# Patient Record
Sex: FEMALE | Race: WHITE | NOT HISPANIC OR LATINO | Employment: OTHER | URBAN - METROPOLITAN AREA
[De-identification: names, ages, dates, MRNs, and addresses within clinical notes are randomized per-mention and may not be internally consistent; named-entity substitution may affect disease eponyms.]

---

## 2017-04-14 ENCOUNTER — OFFICE VISIT (OUTPATIENT)
Dept: INFECTIOUS DISEASES | Facility: CLINIC | Age: 72
End: 2017-04-14
Attending: INTERNAL MEDICINE
Payer: MEDICARE

## 2017-04-14 VITALS
HEIGHT: 65 IN | BODY MASS INDEX: 32.12 KG/M2 | DIASTOLIC BLOOD PRESSURE: 88 MMHG | WEIGHT: 192.8 LBS | OXYGEN SATURATION: 96 % | TEMPERATURE: 97.8 F | HEART RATE: 92 BPM | SYSTOLIC BLOOD PRESSURE: 142 MMHG

## 2017-04-14 DIAGNOSIS — G62.9 SMALL FIBER NEUROPATHY: Primary | ICD-10-CM

## 2017-04-14 PROCEDURE — 99212 OFFICE O/P EST SF 10 MIN: CPT | Mod: ZF

## 2017-04-14 RX ORDER — HYDROXYZINE HYDROCHLORIDE 25 MG/1
25 TABLET, FILM COATED ORAL PRN
COMMUNITY
End: 2017-09-22

## 2017-04-14 RX ORDER — BUSPIRONE HYDROCHLORIDE 5 MG/1
5 TABLET ORAL PRN
COMMUNITY
End: 2017-09-22

## 2017-04-14 ASSESSMENT — PAIN SCALES - GENERAL: PAINLEVEL: MODERATE PAIN (5)

## 2017-04-14 NOTE — NURSING NOTE
Lyme disease  Pt roomed, vitals, meds, and allergies reviewed with pt. Pt ready for provider.  Josh Urena, CMA

## 2017-04-14 NOTE — LETTER
"4/14/2017      RE: Alise Colon  [address] [place] [address]       [facility] [place]   Infectious Diseases and International Medicine  [facility] [phone]    Patient:  Alise Colon   Date of birth 1945   Medical record number [id number]  Date of Visit:  04/14/2017            Assessment and Recommendations:     Assessment:  71 year old woman with multiple symptoms without clear explanation here to discuss possible lyme disease. The patient has numerous chronic symptoms and has seen multiple specialists, including ID, about 9 months ago. Her lyme western blot x 2 over the summer were negative for IgG antibodies. She has blood work through [facility] which was positive for 2 bands. We discussed the nature of antibody testing, which is an indirect measure of previous infection. Memory antibodies, or IgG, should remain positive for life, particularly if someone has untreated disease. A negative IgG through a reputable lab performing FDA approved assays, is sufficient to say that her chronic symptoms are not do to Lyme disease. I did discuss the risk of Lyme for [place], and that new infections in the Spring and Summer are common.    Recommendations:  1. Reassurance given. Patent expressed satisfaction in our discussion.  2. No work-up or treatments recommended    F/up PRN    Ishan Guzman MD    Infectious Diseases and International Medicine  Pager: [phone]        History of Present Illness:     Patient is referred to ID clinic by Ronda Valencia MD for consultation regarding Lyme disease.    Alise Colon is a 71 year old woman with questions regarding her symptoms and lyme disease. The patient has chronic symptoms which give her the sensation that she is \"under attack.\" She has a print-out explaining her symptoms and recent medical encounters. Her symptoms include: tingling/pin bricks in hands, " "feet, lower legs, face; burning feet; aching/burning joints; jaw stiffness/stiff neck/sore spine; headaches; buzzing ears/decreased hearing; red eyes/blurry vision; word finding problems; difficulty multi-tasking. She has seen rheumatology and was diagnosed with RA. She tells me that since her inflammatory markers are low, she does not require treatment. She did see Dr. Choudhury, with ID last June at [facility]. She had testing for lyme and HepC and both were negative. In addition, she took it upon herself to go to the [facility] for syphilis testing. This was also negative. Her primary concern involves yard work at home several years ago. While she was working she noticed a red round michael on her arm. [place] is adjoining [place] heavily [place]ed area, therefore she was worried about Lyme disease.    The patient has done research on Lyme disease and worries about this \"silent epidemic.\" she wonders what research there is on this topic. The patient has not been treated specifically for lyme in the past, but alludes to previous antibiotic treatments for other infections. She also has a h/o c.diff infection.        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  Small fiber neuropathy, treated with neurontin  YUMIKO       Past Surgical History:     Past Surgical History:   Procedure Laterality Date     COLONOSCOPY  9/18/2012    Procedure: COLONOSCOPY;  COLONOSCOPY ;  Surgeon: Evan Hays MD;  Location:  GI     KNEE SURGERY      left     MAMMOPLASTY REDUCTION       ORTHOPEDIC SURGERY  2012    toe fused     ORTHOPEDIC SURGERY  2011    knee arthroscopy     TOE SURGERY      left big toe     TONSILLECTOMY             Allergies:      Allergies   Allergen " "Reactions     Morphine Other (See Comments)     Severe agitation     Hydrocodone-Acetaminophen Nausea     Sertraline Other (See Comments)     Head ache     Cephalexin Hcl      Lisinopril      Percocet [Oxycodone-Acetaminophen]      Plaquenil [Hydroxychloroquine]      Worsening tinnitus  Worsening tinnitus     Remeron [Mirtazapine] Swelling     Ankle edema     Toradol      Redness, numbness in hands          Current Medications:     Current Outpatient Prescriptions   Medication     nortriptyline (PAMELOR) 10 MG capsule     gabapentin (NEURONTIN) 300 MG capsule     ASPIRIN PO     losartan (COZAAR) 25 MG tablet     fluocinolone acetonide 0.01 % OIL     omeprazole (PRILOSEC) 10 MG capsule     ketoconazole (NIZORAL) 2 % cream     losartan-hydrochlorothiazide (HYZAAR) 100-25 MG per tablet     OMEPRAZOLE PO     VITAMIN D, CHOLECALCIFEROL, PO     No current facility-administered medications for this visit.            Family History:     Family History   Problem Relation Age of Onset     Anxiety Disorder Mother      Dementia Mother 88          Social History:     Social History     Social History     Marital status:      Spouse name: N/A     Number of children: N/A     Years of education: N/A     Occupational History     Not on file.     Social History Main Topics     Smoking status: Former Smoker     Packs/day: 1.00     Years: 20.00     Quit date: 1/1/1996     Smokeless tobacco: Not on file     Alcohol use No     Drug use: No     Sexual activity: Not on file     Other Topics Concern     Not on file     Social History Narrative          Physical Exam:   /88  Pulse 92  Temp 97.8  F (36.6  C) (Oral)  Ht 1.651 m (5' 5\")  Wt 87.5 kg (192 lb 12.8 oz)  SpO2 96%  BMI 32.08 kg/m2     Exam:  GENERAL:  well-developed, well-nourished, in no acute distress. Friendly  HENT:  Head is normocephalic, atraumatic.   EYES:  Eyes have anicteric sclerae without conjunctival injection   NEUROLOGIC:  Grossly nonfocal. Normal " gait and station  PSYCH: nl affect             Laboratory Data:     Creatinine   Date Value Ref Range Status   10/19/2016 0.72 0.52 - 1.04 mg/dL Final   07/06/2016 0.69 0.52 - 1.04 mg/dL Final     WBC   Date Value Ref Range Status   10/19/2016 6.4 4.0 - 11.0 10e9/L Final   07/06/2016 5.7 4.0 - 11.0 10e9/L Final     Hemoglobin   Date Value Ref Range Status   10/19/2016 14.8 11.7 - 15.7 g/dL Final     Platelet Count   Date Value Ref Range Status   10/19/2016 237 150 - 450 10e9/L Final     Sed Rate   Date Value Ref Range Status   10/19/2016 13 0 - 30 mm/h Final   07/06/2016 13 0 - 30 mm/h Final     CRP Inflammation   Date Value Ref Range Status   10/19/2016 5.8 0.0 - 8.0 mg/L Final   07/06/2016 8.3 (H) 0.0 - 8.0 mg/L Final     AST   Date Value Ref Range Status   10/19/2016 19 0 - 45 U/L Final   07/06/2016 17 0 - 45 U/L Final     ALT   Date Value Ref Range Status   10/19/2016 22 0 - 50 U/L Final   07/06/2016 15 0 - 50 U/L Final     Alkaline Phosphatase   Date Value Ref Range Status   10/19/2016 77 40 - 150 U/L Final     Bilirubin Total   Date Value Ref Range Status   10/19/2016 0.6 0.2 - 1.3 mg/dL Final       7/6/16 lyme Ab negative            Attestation:  I have reviewed the electronic medical record for the past visits and hospitalizations. I have reviewed relevant Medications, Vital Signs and Labs.     Ishan Guzman MD

## 2017-04-14 NOTE — PROGRESS NOTES
"[facility]   Infectious Diseases and International Medicine  [facility] [phone]    Patient:  Alise Colon   Date of birth 1945   Medical record number [id number]  Date of Visit:  04/14/2017            Assessment and Recommendations:     Assessment:  71 year old woman with multiple symptoms without clear explanation here to discuss possible lyme disease. The patient has numerous chronic symptoms and has seen multiple specialists, including ID, about 9 months ago. Her lyme western blot x 2 over the summer were negative for IgG antibodies. She has blood work through [facility] lab which was positive for 2 bands. We discussed the nature of antibody testing, which is an indirect measure of previous infection. Memory antibodies, or IgG, should remain positive for life, particularly if someone has untreated disease. A negative IgG through a reputable lab performing FDA approved assays, is sufficient to say that her chronic symptoms are not do to Lyme disease. I did discuss the risk of Lyme for [place], and that new infections in the Spring and Summer are common.    Recommendations:  1. Reassurance given. Patent expressed satisfaction in our discussion.  2. No work-up or treatments recommended    F/up PRN    Ishan Guzman MD    Infectious Diseases and International Medicine  Pager: [phone]        History of Present Illness:     Patient is referred to ID clinic by Ronda Valencia MD for consultation regarding Lyme disease.    Alise Colon is a 71 year old woman with questions regarding her symptoms and lyme disease. The patient has chronic symptoms which give her the sensation that she is \"under attack.\" She has a print-out explaining her symptoms and recent medical encounters. Her symptoms include: tingling/pin bricks in hands, feet, lower legs, face; burning feet; aching/burning joints; jaw stiffness/stiff neck/sore " "spine; headaches; buzzing ears/decreased hearing; red eyes/blurry vision; word finding problems; difficulty multi-tasking. She has seen rheumatology and was diagnosed with RA. She tells me that since her inflammatory markers are low, she does not require treatment. She did see Dr. Choudhury, with ID last June at [facility]. She had testing for lyme and HepC and both were negative. In addition, she took it upon herself to go to [facility] for syphilis testing. This was also negative. Her primary concern involves yard work at home several years ago. While she was working she noticed a red round michael on her arm. This is adjoining a heavily [place]ed area, therefore she was worried about Lyme disease.    The patient has done research on Lyme disease and worries about this \"silent epidemic.\" she wonders what research there is on this topic. The patient has not been treated specifically for lyme in the past, but alludes to previous antibiotic treatments for other infections. She also has a h/o c.diff infection.        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  Small fiber neuropathy, treated with neurontin  [facility]IKO       Past Surgical History:     Past Surgical History:   Procedure Laterality Date     COLONOSCOPY  9/18/2012    Procedure: COLONOSCOPY;  COLONOSCOPY ;  Surgeon: Evan Hays MD;  Location:  GI     KNEE SURGERY      left     MAMMOPLASTY REDUCTION       ORTHOPEDIC SURGERY  2012    toe fused     ORTHOPEDIC SURGERY  2011    knee arthroscopy     TOE SURGERY      left big toe     TONSILLECTOMY             Allergies:      Allergies   Allergen Reactions     Morphine Other (See Comments)     Severe agitation     Hydrocodone-Acetaminophen " "Nausea     Sertraline Other (See Comments)     Head ache     Cephalexin Hcl      Lisinopril      Percocet [Oxycodone-Acetaminophen]      Plaquenil [Hydroxychloroquine]      Worsening tinnitus  Worsening tinnitus     Remeron [Mirtazapine] Swelling     Ankle edema     Toradol      Redness, numbness in hands          Current Medications:     Current Outpatient Prescriptions   Medication     nortriptyline (PAMELOR) 10 MG capsule     gabapentin (NEURONTIN) 300 MG capsule     ASPIRIN PO     losartan (COZAAR) 25 MG tablet     fluocinolone acetonide 0.01 % OIL     omeprazole (PRILOSEC) 10 MG capsule     ketoconazole (NIZORAL) 2 % cream     losartan-hydrochlorothiazide (HYZAAR) 100-25 MG per tablet     OMEPRAZOLE PO     VITAMIN D, CHOLECALCIFEROL, PO     No current facility-administered medications for this visit.            Family History:     Family History   Problem Relation Age of Onset     Anxiety Disorder Mother      Dementia Mother 88          Social History:     Social History     Social History     Marital status:      Spouse name: N/A     Number of children: N/A     Years of education: N/A     Occupational History     Not on file.     Social History Main Topics     Smoking status: Former Smoker     Packs/day: 1.00     Years: 20.00     Quit date: 1/1/1996     Smokeless tobacco: Not on file     Alcohol use No     Drug use: No     Sexual activity: Not on file     Other Topics Concern     Not on file     Social History Narrative          Physical Exam:   /88  Pulse 92  Temp 97.8  F (36.6  C) (Oral)  Ht 1.651 m (5' 5\")  Wt 87.5 kg (192 lb 12.8 oz)  SpO2 96%  BMI 32.08 kg/m2     Exam:  GENERAL:  well-developed, well-nourished, in no acute distress. Friendly  HENT:  Head is normocephalic, atraumatic.   EYES:  Eyes have anicteric sclerae without conjunctival injection   NEUROLOGIC:  Grossly nonfocal. Normal gait and station  PSYCH: nl affect             Laboratory Data:     Creatinine   Date Value Ref " Range Status   10/19/2016 0.72 0.52 - 1.04 mg/dL Final   07/06/2016 0.69 0.52 - 1.04 mg/dL Final     WBC   Date Value Ref Range Status   10/19/2016 6.4 4.0 - 11.0 10e9/L Final   07/06/2016 5.7 4.0 - 11.0 10e9/L Final     Hemoglobin   Date Value Ref Range Status   10/19/2016 14.8 11.7 - 15.7 g/dL Final     Platelet Count   Date Value Ref Range Status   10/19/2016 237 150 - 450 10e9/L Final     Sed Rate   Date Value Ref Range Status   10/19/2016 13 0 - 30 mm/h Final   07/06/2016 13 0 - 30 mm/h Final     CRP Inflammation   Date Value Ref Range Status   10/19/2016 5.8 0.0 - 8.0 mg/L Final   07/06/2016 8.3 (H) 0.0 - 8.0 mg/L Final     AST   Date Value Ref Range Status   10/19/2016 19 0 - 45 U/L Final   07/06/2016 17 0 - 45 U/L Final     ALT   Date Value Ref Range Status   10/19/2016 22 0 - 50 U/L Final   07/06/2016 15 0 - 50 U/L Final     Alkaline Phosphatase   Date Value Ref Range Status   10/19/2016 77 40 - 150 U/L Final     Bilirubin Total   Date Value Ref Range Status   10/19/2016 0.6 0.2 - 1.3 mg/dL Final       7/6/16 lyme Ab negative            Attestation:  I have reviewed the electronic medical record for the past visits and hospitalizations. I have reviewed relevant Medications, Vital Signs and Labs.

## 2017-04-14 NOTE — MR AVS SNAPSHOT
"              After Visit Summary   4/14/2017    Alise Colon    MRN: [id number]           Patient Information     Date Of Birth          1945        Visit Information        Provider Department      4/14/2017 1:00 PM Ishan Guzman MD [facility] and Infectious Diseases         Follow-ups after your visit        Follow-up notes from your care team     Return if symptoms worsen or fail to improve.      Your next 10 appointments already scheduled     Aug 02, 2017 10:00 AM CDT   (Arrive by 9:45 AM)   Return Visit with Darío Ambrocio MD   [facility] ([facility])    [address] [place] [address]  3rd Floor  [facility] [address] [phone]              Who to contact     If you have questions or need follow up information about today's clinic visit or your schedule please contact [facility] AND INFECTIOUS DISEASES directly at [phone].  Normal or non-critical lab and imaging results will be communicated to you by MyChart, letter or phone within 4 business days after the clinic has received the results. If you do not hear from us within 7 days, please contact the clinic through MyChart or phone. If you have a critical or abnormal lab result, we will notify you by phone as soon as possible.  Submit refill requests through [employer] ([employer]) or call your pharmacy and they will forward the refill request to us. Please allow 3 business days for your refill to be completed.          Additional Information About Your Visit        [employer] Information     [employer] ([employer]) lets you send messages to your doctor, view your test results, renew your prescriptions, schedule appointments and more. To sign up, go to [email or web address] . Click on \"Log in\" on the left side of the screen, which will take you to the Welcome page. Then click on \"Sign up Now\" on the right side of the page.     You will be asked to enter the access code listed below, as well as some personal " "information. Please follow the directions to create your username and password.     Your access code is: [email or web address]  Expires: 7/3/2017  6:30 AM     Your access code will  in 90 days. If you need help or a new code, please call your [facility] or [phone].        Care EveryWhere ID     This is your Care EveryWhere ID. This could be used by other organizations to access your [facility] medical records  [id number]        Your Vitals Were     Pulse Temperature Height Pulse Oximetry BMI (Body Mass Index)       92 97.8  F (36.6  C) (Oral) 1.651 m (5' 5\") 96% 32.08 kg/m2        Blood Pressure from Last 3 Encounters:   17 142/88   16 (!) 170/97   10/19/16 (!) 160/99    Weight from Last 3 Encounters:   17 87.5 kg (192 lb 12.8 oz)   16 84.2 kg (185 lb 9.6 oz)   10/19/16 84.2 kg (185 lb 9.6 oz)              Today, you had the following     No orders found for display       Primary Care Provider Office Phone # Fax #    Ana Rosa Lima [phone]       [facility] [address]  [facility] [address]        Thank you!     Thank you for choosing [facility] AND INFECTIOUS DISEASES  for your care. Our goal is always to provide you with excellent care. Hearing back from our patients is one way we can continue to improve our services. Please take a few minutes to complete the written survey that you may receive in the mail after your visit with us. Thank you!             Your Updated Medication List - Protect others around you: Learn how to safely use, store and throw away your medicines at www.disposemymeds.org.          This list is accurate as of: 17  1:25 PM.  Always use your most recent med list.                   Brand Name Dispense Instructions for use    ASPIRIN PO      Take 81 mg by mouth daily       busPIRone 5 MG tablet    BUSPAR     Take 5 mg by mouth as needed       fluocinolone acetonide 0.01 % Oil          gabapentin 300 MG capsule "    NEURONTIN     300 mg       hydrOXYzine 25 MG tablet    ATARAX     Take 25 mg by mouth as needed for itching       ketoconazole 2 % cream    NIZORAL         losartan-hydrochlorothiazide 100-25 MG per tablet    HYZAAR     Take 0.5 tablets by mouth daily       nortriptyline 10 MG capsule    PAMELOR     Take 10 mg by mouth       * OMEPRAZOLE PO      Take 10 mg by mouth every morning       * priLOSEC 10 MG CR capsule   Generic drug:  omeprazole      Take 10 mg by mouth       VISTARIL PO      Take by mouth as needed for itching       VITAMIN D (CHOLECALCIFEROL) PO      Take 2,000 Units by mouth daily       * Notice:  This list has 2 medication(s) that are the same as other medications prescribed for you. Read the directions carefully, and ask your doctor or other care provider to review them with you.

## 2017-07-20 ENCOUNTER — TELEPHONE (OUTPATIENT)
Dept: RHEUMATOLOGY | Facility: CLINIC | Age: 72
End: 2017-07-20

## 2017-07-20 NOTE — TELEPHONE ENCOUNTER
Pt asking lab orders for 8/2/17 Dr. Ambrocio appt be put in. Also asking for confirmation that he does not have afternoon appts as she was switched from AM appt. Please advise at [phone]. Sent to [employer].

## 2017-07-21 NOTE — TELEPHONE ENCOUNTER
Called and spoke with pt regarding upcoming appt.  Discussed that this provider has not seen her at this time and he will order labs after he has been able to discuss with her.  Also let her know that Dr. Ambrocio does have afternoon appts and currently I would need to have her come in, in late September if she wishes to wait.  Pt understands and wishes to keep her morning appt at this time.  Did let her know that I am Dr. Avery nurse and she can call at any time.    Kim Yates RN  [facility] Clinic

## 2017-09-22 ENCOUNTER — OFFICE VISIT (OUTPATIENT)
Dept: RHEUMATOLOGY | Facility: CLINIC | Age: 72
End: 2017-09-22
Attending: INTERNAL MEDICINE
Payer: MEDICARE

## 2017-09-22 VITALS
HEART RATE: 55 BPM | DIASTOLIC BLOOD PRESSURE: 86 MMHG | BODY MASS INDEX: 33.29 KG/M2 | WEIGHT: 199.8 LBS | OXYGEN SATURATION: 94 % | HEIGHT: 65 IN | SYSTOLIC BLOOD PRESSURE: 148 MMHG

## 2017-09-22 DIAGNOSIS — M25.50 PAIN IN JOINT, MULTIPLE SITES: ICD-10-CM

## 2017-09-22 DIAGNOSIS — R76.8 ANTI-CYCLIC CITRULLINATED PEPTIDE ANTIBODY POSITIVE: ICD-10-CM

## 2017-09-22 DIAGNOSIS — R53.82 CHRONIC FATIGUE: ICD-10-CM

## 2017-09-22 DIAGNOSIS — E55.9 VITAMIN D DEFICIENCY: ICD-10-CM

## 2017-09-22 DIAGNOSIS — Z85.72 H/O LYMPHOMA: ICD-10-CM

## 2017-09-22 DIAGNOSIS — R76.8 ANTI-CYCLIC CITRULLINATED PEPTIDE ANTIBODY POSITIVE: Primary | ICD-10-CM

## 2017-09-22 LAB
CRP SERPL-MCNC: 5.6 MG/L (ref 0–8)
ERYTHROCYTE [SEDIMENTATION RATE] IN BLOOD BY WESTERGREN METHOD: 13 MM/H (ref 0–30)
VIT B12 SERPL-MCNC: 526 PG/ML (ref 193–986)

## 2017-09-22 PROCEDURE — 86431 RHEUMATOID FACTOR QUANT: CPT | Performed by: INTERNAL MEDICINE

## 2017-09-22 PROCEDURE — 85652 RBC SED RATE AUTOMATED: CPT | Performed by: INTERNAL MEDICINE

## 2017-09-22 PROCEDURE — 99212 OFFICE O/P EST SF 10 MIN: CPT | Mod: ZF

## 2017-09-22 PROCEDURE — 82607 VITAMIN B-12: CPT | Performed by: INTERNAL MEDICINE

## 2017-09-22 PROCEDURE — 86235 NUCLEAR ANTIGEN ANTIBODY: CPT | Performed by: INTERNAL MEDICINE

## 2017-09-22 PROCEDURE — 86200 CCP ANTIBODY: CPT | Performed by: INTERNAL MEDICINE

## 2017-09-22 PROCEDURE — 86140 C-REACTIVE PROTEIN: CPT | Performed by: INTERNAL MEDICINE

## 2017-09-22 PROCEDURE — 36415 COLL VENOUS BLD VENIPUNCTURE: CPT | Performed by: INTERNAL MEDICINE

## 2017-09-22 PROCEDURE — 86038 ANTINUCLEAR ANTIBODIES: CPT | Performed by: INTERNAL MEDICINE

## 2017-09-22 ASSESSMENT — PAIN SCALES - GENERAL: PAINLEVEL: SEVERE PAIN (7)

## 2017-09-22 NOTE — MR AVS SNAPSHOT
After Visit Summary   9/22/2017    Alise Colon    MRN: [id number]           Patient Information     Date Of Birth          1945        Visit Information        Provider Department      9/22/2017 10:30 AM Dylon Bennett,  [facility]        Today's Diagnoses     Anti-cyclic citrullinated peptide antibody positive    -  1    Pain in joint, multiple sites        Vitamin D deficiency         Chronic fatigue        H/O lymphoma          Care Instructions    -- we agree with the stretching exercises as well as the pool therapy.  -- we will check some labs today  -- xrays for the hands and the feet  -- see you again in 1 year.          Follow-ups after your visit        Follow-up notes from your care team     Return in about 1 year (around 9/22/2018).      Your next 10 appointments already scheduled     Sep 22, 2017 12:25 PM CDT   XR FOOT BILATERAL G/E 3 VIEWS with XR1   [facility] ([facility])    [address] [place] [id number] [phone]           Please bring a list of your current medicines to your exam. (Include vitamins, minerals and over-thecounter medicines.) Leave your valuables at home.  Tell your doctor if there is a chance you may be pregnant.  You do not need to do anything special for this exam.            Sep 22, 2017 12:50 PM CDT   XR HAND BILATERAL G/E 3 VIEWS with XR1   [facility] ([facility])    [address] [place] [id number] [phone]           Please bring a list of your current medicines to your exam. (Include vitamins, minerals and over-thecounter medicines.) Leave your valuables at home.  Tell your doctor if there is a chance you may be pregnant.  You do not need to do anything special for this exam.            Sep 22, 2017  1:15 PM CDT   Lab with  LAB   [facility] ([facility])  "   [address]  [address] [facility] [id number] [phone]            Sep 21, 2018 10:30 AM CDT   (Arrive by 10:15 AM)   Return Visit with Dylon Bennett DO   [facility] ([facility])    [address]  [address] [facility] [id number] [phone]              Future tests that were ordered for you today     Open Future Orders        Priority Expected Expires Ordered    SSA Ro INES Antibody IgG Routine  9/22/2018 9/22/2017    SSB La INES Antibody IgG Routine  9/22/2018 9/22/2017    Vitamin B12 Routine  9/22/2018 9/22/2017    CRP inflammation Routine  9/22/2018 9/22/2017    X-ray bl Foot 3+ views Routine 9/22/2017 4/20/2018 9/22/2017    XR Hand Bilateral G/E 3 Views Routine 9/22/2017 9/22/2018 9/22/2017    Cyclic Citrullinated Peptide Antibody IgG Routine  9/22/2018 9/22/2017    Rheumatoid factor Routine  9/22/2018 9/22/2017    ESR Routine  9/22/2018 9/22/2017            Who to contact     If you have questions or need follow up information about today's clinic visit or your schedule please contact [facility] directly at [phone].  Normal or non-critical lab and imaging results will be communicated to you by MyChart, letter or phone within 4 business days after the clinic has received the results. If you do not hear from us within 7 days, please contact the clinic through [employer] or phone. If you have a critical or abnormal lab result, we will notify you by phone as soon as possible.  Submit refill requests through [employer] or call your pharmacy and they will forward the refill request to us. Please allow 3 business days for your refill to be completed.          Additional Information About Your Visit        [employer] Information     [employer] lets you send messages to your doctor, view your test results, renew your prescriptions, schedule appointments and more. To sign up, go to [email or web address] . Click on \"Log in\" on the left " "side of the screen, which will take you to the Welcome page. Then click on \"Sign up Now\" on the right side of the page.     You will be asked to enter the access code listed below, as well as some personal information. Please follow the directions to create your username and password.     Your access code is: [email or web address]  Expires: 10/17/2017  6:30 AM     Your access code will  in 90 days. If you need help or a new code, please call your [facility] or [phone].        Care EveryWhere ID     This is your Care EveryWhere ID. This could be used by other organizations to access your [place] medical records  [id number]        Your Vitals Were     Pulse Height Pulse Oximetry BMI (Body Mass Index)          55 1.651 m (5' 5\") 94% 33.25 kg/m2         Blood Pressure from Last 3 Encounters:   17 148/86   17 142/88   16 (!) 170/97    Weight from Last 3 Encounters:   17 90.6 kg (199 lb 12.8 oz)   17 87.5 kg (192 lb 12.8 oz)   16 84.2 kg (185 lb 9.6 oz)              We Performed the Following     Anti Nuclear Cha IgG by IFA with Reflex        Primary Care Provider Office Phone # Fax #    Ana Rosa Lima [phone]       [facility] [place], [facility] [address] [address]  [facility] [address]        Equal Access to Services     [facility]: Hadii aad ku hadasho Soomaali, waaxda luqadaha, qaybta kaalmada carmeloyamyriam, colin taylor . So [facility] [phone].    ATENCIÓN: Si bhartila andriy, tiene a tucker disposición servicios gratuitos de asistencia lingüística. Llame al [phone].    We comply with applicable federal civil rights laws and [place] laws. We do not discriminate on the basis of race, color, national origin, age, disability sex, sexual orientation or gender identity.            Thank you!     Thank you for choosing [facility] RHEUMATOLOG[facility]  for your care. Our goal is always to provide you with excellent care. Hearing back from our patients is " one way we can continue to improve our services. Please take a few minutes to complete the written survey that you may receive in the mail after your visit with us. Thank you!             Your Updated Medication List - Protect others around you: Learn how to safely use, store and throw away your medicines at www.disposemymeds.org.          This list is accurate as of: 9/22/17 12:22 PM.  Always use your most recent med list.                   Brand Name Dispense Instructions for use Diagnosis    ALPHA LIPOIC ACID PO      Take 200 mg by mouth daily    Anti-cyclic citrullinated peptide antibody positive, Pain in joint, multiple sites       gabapentin 300 MG capsule    NEURONTIN     600 mg daily        ketoconazole 2 % cream    NIZORAL     shampoo    Polycythemia       losartan-hydrochlorothiazide 100-25 MG per tablet    HYZAAR     Take 0.5 tablets by mouth daily        priLOSEC 10 MG CR capsule   Generic drug:  omeprazole      Take 10 mg by mouth    Polycythemia       PROPRANOLOL HCL PO      Take 60 mg by mouth daily    Anti-cyclic citrullinated peptide antibody positive, Pain in joint, multiple sites       VITAMIN D (CHOLECALCIFEROL) PO      Take 2,000 Units by mouth daily

## 2017-09-22 NOTE — LETTER
9/22/2017      RE: Alise Cloon  [address] [place] [address]       [facility] - Rheumatology Clinic Visit     Alise Colon MRN# [id number]   YOB: 1945    Primary care provider: Ana Rosa Lima  Date of Visit: 9/22/2017  Last Seen: 8/1/2016          Assessment and Plan:   # Subclinical Rheumatoid Arthritis (strongly positive ACPA and mildly positive RF) onset at least 2009  # Paresthesia- chronic  # Family history of chron's disease  # DVT 2011  # H/o NHL 1999; s/p CHOP; no radiotherapy;  # Erythrocytosis- hematology evaluation 2016  # Vitamin D deficiency - currently ongoing replacement with PCP    This is the fourth rheumatology opinion/consultation in the last three to four years.   Last visit Ms. Colon brought the results of [place] igenex lyme's test in which three bands were positive (2016). However lyme's screening was negative in 2014.Also negative in our lab. Patient was reassured.   Seropositive and likely subclinical rheumatoid arthritis is the diagnosis at this point of time. Patient was advised though that it could accelerate at some point in the future to become a clear-cut seropositive rheumatoid arthritis. Previous Prednisone trial without any significant subjective changes. We would not initiate any specific treatment for positive serologies alone as there is no synovitis at this time and patient does not seem to be interested in any DMARD. In addition there are no definite erosive changes on the recent Hands XR.     The most bothering symptom for the patient is still burning sensation in hands and feet. We believe it is likely due to peripheral neuropathy, etiology is still unknown. Not previously associated with her chemotherapy.  No evidence of Polycythemia vera.    We would like to see Ms. Colon in one year.  Repeat hand and foot x-ray today.  Repeat CCP and RF labs.    The patient was seen and discussed with Dr. Keating who  agrees with the above assessment and plan.     Dylon Bennett   Rheumatology Fellow    Attending Note: I saw and evaluated the patient with Dr. Bennett. I agree with the assessment and plan.    Brigitte Keating MD    Orders Placed This Encounter   Procedures     X-ray bl Foot 3+ views     XR Hand Bilateral G/E 3 Views     Cyclic Citrullinated Peptide Antibody IgG     Rheumatoid factor     ESR     CRP inflammation     Vitamin B12     Anti Nuclear Cha IgG by IFA with Reflex     SSA Ro INES Antibody IgG     SSB La INES Antibody IgG                 Active Problem List:     Patient Active Problem List    Diagnosis Date Noted     Polycythemia 10/19/2016     Priority: Medium     Major depressive disorder 01/19/2016     Priority: Medium     Headache 02/05/2015     Priority: Medium     Problem list name updated by automated process. Provider to review              History of Present Illness:     No chief complaint on file.  Initially seen July 6, 2016     Ms. Colon is a 70 year old lady referred for second opinion on RA diagnosis and management.    Current joint pain history:   Joint pain history:   Onset: 2009; worse in the last 1.5 years  Involved joints: MTPs, wrists, knees, ankles   Pain scale: 8/10 in AM; all these areas worse in AM     Wakes the patient from sleep : sometimes  Morning stiffness : 3 hours  Meds used: plaquenil made tinnitus worse;     Myalgias in calves +ve  Daughter has chron's disease    DVT 2011  H/o NHL 1999; s/p CHOP; no radiotherapy;    No h/o unintentional weight loss, loss of appetite, fevers, chills, rash, swollen glands  No h/o gout  No family or personal history of psoriasis, ulcerative colitis or chron's disease. No h/o sausage digits, iritis. No enthesitis, plantar fascitis or heel pain.   Patient denies any raynauds, sicca symptoms,  No h/o persistent shortness of breath, cough, chest pain  No h/o persistent nausea, vomiting, constipation, diarrhea, abdominal pain  No h/o hematochezia, hematuria,  "hemoptysis, hematemesis  No h/o seizures or strokes    CCP > 250  SANFORD homogenous 1:80  Mildly positive RF  Lyme negative.   CRP borderline elevation +ve    Right foot films ordered and independently interpreted.   Indication: Right foot pain.   Assessment: Significant hallux valgus and 1st MTP arthritis, otherwise unremarkable.     Left foot films ordered and independently interpreted.   Indication: Left foot pain   Assessment: Fusion of the great toe, crossing screws in place, no acute findings.    Interim history 8/1/16:  CCP 89 (trended down)  SANFORD homogenous: negative (trended down)  RF: negative (trended down)  Lyme serology: negative.   CRP borderline elevation +ve: 8.3  Sed Rate: negative  Hep C: equivocal results, will repeat Hep C antibody today    Patient's main complain is numbing, burning and aching sensation in her feet and calfes right after awakening in the morning. She does not have a morning stiffness as such but muscle pain. 8/10. However it takes her up to 1 hour to get back to her baseline (which is an improvement from 3 hours before). She does not have any stiffness in her hands. He sis not experienced any changes while taking Prednisone for 2 weeks (10 mg daily for 14 days). She states that her muscle \"pain\" (eg, burning sensation) is alleviated by hot tube use.     Interim History 8/1/2016 - 9/22/2017:  Patient returns continued joint pains and paresthesias does not developed any morning stiffness of her hands.  She mentions that the pain she gets is more constant and did not previously respond to prednisone trials.  The sore and burning sensations will occasionally move from her toes to her knees to her fingers to her wrists and ankles.  She feels like she will have neck creaks and cracks.  Denies any fevers chills no lymphadenopathy or unintended weight loss.  Denies any hot or swollen joints with her pains.         Review of Systems:   Complete ROS negative except for symptoms mentioned in " the HPI          Past Medical History:     Past Medical History:   Diagnosis Date     Back pain      Depressive disorder      Fibromyalgia age 43     Hypertension      Lymphoma (H) 1999    started chemo     Past Surgical History:   Procedure Laterality Date     COLONOSCOPY  9/18/2012    Procedure: COLONOSCOPY;  COLONOSCOPY ;  Surgeon: Evan Hays MD;  Location:  GI     KNEE SURGERY      left     MAMMOPLASTY REDUCTION       ORTHOPEDIC SURGERY  2012    toe fused     ORTHOPEDIC SURGERY  2011    knee arthroscopy     TOE SURGERY      left big toe     TONSILLECTOMY              Social History:     Social History     Occupational History     Not on file.     Social History Main Topics     Smoking status: Former Smoker     Packs/day: 1.00     Years: 20.00     Quit date: 1/1/1996     Smokeless tobacco: Not on file     Alcohol use No     Drug use: No     Sexual activity: Not on file            Family History:     Family History   Problem Relation Age of Onset     Anxiety Disorder Mother      Dementia Mother 88            Allergies:     Allergies   Allergen Reactions     Morphine Other (See Comments)     Severe agitation     Hydrocodone-Acetaminophen Nausea     Sertraline Other (See Comments)     Head ache     Cephalexin Hcl      Ketorolac      Lisinopril      Percocet [Oxycodone-Acetaminophen]      Plaquenil [Hydroxychloroquine]      Worsening tinnitus  Worsening tinnitus     Remeron [Mirtazapine] Swelling     Ankle edema     Toradol      Redness, numbness in hands            Medications:     Current Outpatient Prescriptions   Medication Sig Dispense Refill     PROPRANOLOL HCL PO Take 60 mg by mouth daily       ALPHA LIPOIC ACID PO Take 200 mg by mouth daily       gabapentin (NEURONTIN) 300 MG capsule 600 mg daily        omeprazole (PRILOSEC) 10 MG capsule Take 10 mg by mouth       ketoconazole (NIZORAL) 2 % cream shampoo       losartan-hydrochlorothiazide (HYZAAR) 100-25 MG per tablet Take 0.5 tablets by mouth daily    "    VITAMIN D, CHOLECALCIFEROL, PO Take 2,000 Units by mouth daily               Physical Exam:   Blood pressure 148/86, pulse 55, height 1.651 m (5' 5\"), weight 90.6 kg (199 lb 12.8 oz), SpO2 94 %.  Wt Readings from Last 4 Encounters:   09/22/17 90.6 kg (199 lb 12.8 oz)   04/14/17 87.5 kg (192 lb 12.8 oz)   11/01/16 84.2 kg (185 lb 9.6 oz)   10/19/16 84.2 kg (185 lb 9.6 oz)     Constitutional: well-developed, appearing stated age; cooperative  Eyes: nl EOM, vision, conjunctiva, sclera  ENT: nl external ears, nose, hearing, lips, teeth, gums, throat  No mucous membrane lesions, normal saliva pool  Neck: no mass or thyroid enlargement  Resp: lungs clear to auscultation,   CV: RRR, no murmurs, rubs or gallops, no edema  GI: no ABD mass or tenderness, no HSM  : not tested  Lymph: no cervical, supraclavicular or epitrochlear nodes    MS: very minimal thickening noted in MCPs without tenderness. MTP tenderness present in the 1st CMC.   All shoulder, elbow, wrist, MCP/PIP/DIP, hip, knee, ankle, and foot MTP/IP joints were examined and  found normal. No active synovitis or deformity. Full ROM.  Fist 100%.  No dactylitis,  tenosynovitis, enthesopathy.  Skin: no nail pitting, alopecia, rash, nodules or lesions  Psych: nl judgement, orientation, memory, affect.         Data:     Results for orders placed or performed in visit on 10/19/16   CBC with platelets differential   Result Value Ref Range    WBC 6.4 4.0 - 11.0 10e9/L    RBC Count 5.97 (H) 3.8 - 5.2 10e12/L    Hemoglobin 14.8 11.7 - 15.7 g/dL    Hematocrit 46.2 35.0 - 47.0 %    MCV 77 (L) 78 - 100 fl    MCH 24.8 (L) 26.5 - 33.0 pg    MCHC 32.0 31.5 - 36.5 g/dL    RDW 13.8 10.0 - 15.0 %    Platelet Count 237 150 - 450 10e9/L    Diff Method Automated Method     % Neutrophils 59.5 %    % Lymphocytes 29.9 %    % Monocytes 8.1 %    % Eosinophils 1.7 %    % Basophils 0.6 %    % Immature Granulocytes 0.2 %    Nucleated RBCs 0 0 /100    Absolute Neutrophil 3.8 1.6 - 8.3 " 10e9/L    Absolute Lymphocytes 1.9 0.8 - 5.3 10e9/L    Absolute Monocytes 0.5 0.0 - 1.3 10e9/L    Absolute Eosinophils 0.1 0.0 - 0.7 10e9/L    Absolute Basophils 0.0 0.0 - 0.2 10e9/L    Abs Immature Granulocytes 0.0 0 - 0.4 10e9/L    Absolute Nucleated RBC 0.0    Reticulocyte count   Result Value Ref Range    % Retic 0.9 0.5 - 2.0 %    Absolute Retic 53.1 25 - 95 10e9/L   Erythrocyte sedimentation rate auto   Result Value Ref Range    Sed Rate 13 0 - 30 mm/h   CRP inflammation   Result Value Ref Range    CRP Inflammation 5.8 0.0 - 8.0 mg/L   Bld morphology pathology review   Result Value Ref Range    Copath Report       Patient Name: ELSA MARTINEZ  MR#: [id number]  Specimen #: [id number]  Collected: 10/19/2016  Received: 10/19/2016  Reported: 10/20/2016 11:01  Ordering Phy(s): JOSE ALBERTO GAO    TEST(S):  Blood Smear Morphology    FINAL DIAGNOSIS:  Peripheral Blood Smear:  - Erythrocytosis (elevated RBC count with normal hemoglobin) with slight  microcytosis; minimal poikilocytosis  - Normal leukocyte count and differential  - Normal platelet count and morphology    I have personally reviewed all specimens and/or slides, including the  listed special stains, and used them with my medical judgment to  determine the final diagnosis.    Electronically signed out by:  Trupti George M.D. [facility]    Technical testing/processing performed at [place], [place]    CLINICAL HISTORY:  From [facility] electronic medical record: The patient is a 71 year old woman  with a history of erythrocytosis with microcytosis since 1997.  Peripheral smear revi ew requested for microcytosis.    MICROSCOPIC DESCRIPTION:  PERIPHERAL BLOOD DATA (Date: October 19, 2016)  Patient Value (Reference Range >18 year old female)  6.4 WBC (4.0-11.0 x 10*9/L)  5.97 RBC (3.8-5.2 x 10*12/L)  14.8 HGB (11.7-15.7 g/dL)  77 MCV (78-100fL)  32.0 MCHC (31.5-36.5 g/dL)  13.8 RDW (10.0-15.0  %)  237 PLT (150-450 x 10*9/L)  53.1 RETIC (25-95 x 10*9/L)    PERIPHERAL BLOOD DIFFERENTIAL (Automated )    (Reference ranges >18 year old female)  Percent  59.5% Neutrophils, segmented and bands  29.9% Lymphocytes  8.1% Monocytes  1.7% Eosinophils  0.6% Basophils  0.2% Neutrophil precursors    Absolute  3.8 Neutrophils, segmented and bands (1.6 - 8.3 x 10*9/L)  1.9 Lymphocytes (0.8 - 5.3 x 10*9/L)  0.5 Monocytes (0 -1.3 x 10*9/L)  0.1 Eosinophils (0 - 0.7 x 10*9/L)  0.0 Basophils (0 - 0.2 x 10*9/L)  0.0 Neutrophil precursors (0 - 0.4 x 10*9/L)    The red cells appear normochromic. Poikilocytosis is minimal.  Polychromasia is not increased. Rouleaux formation is not increased.  The  morphology of the platelets is normal. Lymphocytes appear polymorphous,  and neutrophils contain normal cytoplasmic granulation and unremarkable  nuclear morphology.    CPT Codes:  A: [id number]    TESTING LAB LOCATION:  [facility], [address]   [address] [phone]    COLLECTION SITE:  Client:  [facility]  Location:  [facility] (B)     Protein electrophoresis   Result Value Ref Range    Albumin Fraction 3.8 3.7 - 5.1 g/dL    Alpha 1 Fraction 0.3 0.2 - 0.4 g/dL    Alpha 2 Fraction 0.7 0.5 - 0.9 g/dL    Beta Fraction 1.2 (H) 0.6 - 1.0 g/dL    Gamma Fraction 1.3 0.7 - 1.6 g/dL    Monoclonal Peak 0.0 0.0 g/dL    ELP Interpretation:       Increased beta fraction. No monoclonal protein seen. Pathologic significance   requires clinical correlation.  PAT Aragon M.D., Ph.D., Pathologist.     Comprehensive metabolic panel   Result Value Ref Range    Sodium 138 133 - 144 mmol/L    Potassium 4.0 3.4 - 5.3 mmol/L    Chloride 103 94 - 109 mmol/L    Carbon Dioxide 29 20 - 32 mmol/L    Anion Gap 6 3 - 14 mmol/L    Glucose 80 70 - 99 mg/dL    Urea Nitrogen 12 7 - 30 mg/dL    Creatinine 0.72 0.52 - 1.04 mg/dL    GFR Estimate 79  >60 mL/min/1.7m2    GFR Estimate If Black >90   GFR Calc   >60 mL/min/1.7m2    Calcium 8.5 8.5 - 10.1 mg/dL    Bilirubin Total 0.6 0.2 - 1.3 mg/dL    Albumin 3.2 (L) 3.4 - 5.0 g/dL    Protein Total 7.5 6.8 - 8.8 g/dL    Alkaline Phosphatase 77 40 - 150 U/L    ALT 22 0 - 50 U/L    AST 19 0 - 45 U/L   Lactate Dehydrogenase   Result Value Ref Range    Lactate Dehydrogenase 191 81 - 234 U/L   Next Generation Sequencing Oncology: Myeloproliferative Neoplasm Panel   Result Value Ref Range    Copath Report       Patient Name: ELSA MARTINEZ  MR#: [id number]  Specimen #: [id number]  Collected: 10/19/2016 11:26  Received: 10/19/2016 14:29  Reported: 11/1/2016 18:40  Ordering Phy(s): JOSE ALBERTO GAO    _________________________________________    TEST(S) REQUESTED:  Next Generation Sequencing Oncology-MPN    SPECIMEN DESCRIPTION:  Blood    INTERPRETATION OF RESULTS    ---------------------------------------------------------------------  No mutations were identified in the analyzed regions of JAK2, CALR, or  MPL.    Nearly 100% of polycythemia vera (PV) cases have either JAK2 V617F or an  exon 12 mutation. As this patient's sample is negative for mutations in  these regions, PV is unlikely.    JAK2, CALR, or MPL mutations have also been reported in both essential  thrombocythemia (ET) and primary myelofibrosis (PMF) at variable  prevalence [1-3]. However, the absence of mutations does not exclude  these diagnoses as approximately 10-15% of ET or PMF may be negative for  mutations in these  3 genes.    Correlation with clinical information, morphology, and other diagnostic  tests is indicated.    References:  1. Luis Enrique SH, Dashawn E, Constantine NL, et al. WHO Classification of Tumours  of Haematopoietic and Lymphoid Tissues. [place], [place]: I[employer]; 2008.  2. Justin KNIGHT, Hannah RL, Lashon T, et al.  mutations in  myeloproliferative and other myeloid disorders: a study of 1182  patients. Blood.  2006;108(10):3472-6.  3. Renny T, Kunal MCDONALD, Naye AS, et al. Somatic mutations of  calreticulin in myeloproliferative neoplasms. N Engl J Med.  2013;369(25):1329-00.    ---------------------------------------------------------------------    SIGNIFICANT ALTERATIONS    ---------------------------------------------------------------------  Detected Alterations of Known or Potential Pathogenicity: None  Detected Alterations of Uncertain Significance: None  ---------------------------------------------------------------------    GENETIC ALTERATIONS    ----------------- ----------------------------------------------------  Detected Alterations of Known or Potential Pathogenicity  ---------------------------------------------------------------------  None    ---------------------------------------------------------------------  Detected Alterations of Uncertain Significance  ---------------------------------------------------------------------  None    ---------------------------------------------------------------------  Genes with No Detected Alterations of the Amino Acid Sequence:  ---------------------------------------------------------------------  CALR, JAK2, MPL    ---------------------------------------------------------------------  *Therapeutic Implications: Associated with drug response = related to  drug sensitivity or resistance as described in Drug Response section of  this report; Potentially relevant clinical trials = gene is related to a  trial in the Clinical Trials section of this report COSMIC: Mutation ID  in the Catalogue Of Soma tic Mutations in Cancer  (http://cancer.elia.ac.uk/ [http://cancer.elia.ac.uk/]) Allele  Frequency: Allele frequency of the alteration in the 1000 Genomes  Project (http://www.1000genomes.org/ [http://www.1000genomes.org/])  dbSNP: RS number of alteration in dbSNP database  (http://www.ncbi.nlm.nih.gov/SNP  [http://www.ncbi.nlm.nih.gov/SNP])  ---------------------------------------------------------------------    TEST DETAILS    ---------------------------------------------------------------------  Methodology  ---------------------------------------------------------------------  Genomic DNA is extracted from the sample, and sequencing libraries are  prepared using an amplicon-based target enrichment method on the  [employer] Access Array System. The enriched DNA libraries are  sequenced on an Illumina MiSeq instrument (version 3 chemistry, 300 bp  paired end reads). FASTQ files are processed through a custom designed  bioinformatics pipeline termed Scanindel (Jean Claude et al. [facility],  2015), which utilizes Cutadapt for adaptor trimming, BWA-MEM and BLAT  mapping algorithms for improved indel detection capability, and a  modified Freebayes algorithm for variant calling. The variant  call files (vcf) are filtered by a custom R-script to remove  sub-threshold calls with less than 500X coverage and/or minor allele  frequency (MAF) less than thresholds defined for single nucleotide  variants (5-10%) and insertion/deletion variants (1-5%). Clinically  relevant mutations from this filtered variant list are annotated by a  pathologist with [employer] software and reported. The assay bed file  and 5% SNV hotspot list are available upon request. Sequenced regions of  the clinically ordered gene set are identified in the table below.    Gene(NM Reference): Exons covered  CALR (NM_004343.3): 9; JAK2 (NM_004972.3): 12,14; MPL (NM_005373.1): 10    Less than 500X coverage was achieved in portions of the following exons;  additional mutations in these regions can not be entirely excluded: None    ---------------------------------------------------------------------  Limitations  ---------------------------------------------------------------------  The validated allele frequency thresholds for reporting positive  results  range from 1% to 10% dependent on the type of mutation as follows: 5%  for single nucleotide variants of high clinical utility (list available  upon request); 10% for all other single nucleotide variants; 1% for  insertion-deletion mutations > than 3bp, and 5% for insertion-deletion  mutations 3bp or smaller. Therefore, the tumor cell population must  comprise at least 10-20% of the submitted specimen. Specimens with  borderline tumor cellularity may lead to false negative results. Caution  is advised for the interpretation of negative results in these  situations, and correlation with  morphology and other laboratory results is recommended to ensure  adequate representation of the cell population of interest.  ---------------- -----------------------------------------------------  Disclaimer  ---------------------------------------------------------------------  This test was developed and its performance characteristics determined  by the [facility], Molecular Diagnostics  Laboratory. It has not been cleared or approved by the FDA. The  laboratory is regulated under CLIA as qualified to perform  high-complexity testing. This test is used for clinical purposes. It  should not be regarded as investigational or for research.    ---------------------------------------------------------------------  [employer] Disclaimer  ---------------------------------------------------------------------  This report was produced using software licensed by [employer].  [employer] software is designed to be used in clinical applications  solely as a tool to enhance medical utility and improve operational  efficiency. The use of [employer] software is not a substitute for  medical judgmen t and [employer] in no way holds itself out as having  or providing independent medical judgment or diagnostic services.  [employer] is not liable with respect to any treatment or diagnosis  made  in connection with this report.    ---------------------------------------------------------------------  Electronic Signature  ---------------------------------------------------------------------  Electronically signed by: Nevaeh Scott  11/01/2016    Electronically Signed Out By:  Nevaeh Scott M.D., [facility]    CPT Codes:  A: -[id number], [id number], [id number], CALCCALRPNGSTC2    TESTING LAB LOCATION:  [address] [address] [phone]    COLLECTION SITE:  Client:  [facility]  Location[facility]  [facility]     [facility]   Result Value Ref Range    Result       SEE NOTE 02/28/2017 05:31 PM  (Note)    Test                               Result    Flag  Unit   RefValue  ------------------------------------------------------------------  Oxygen Dissociation P50, RBC   P50 Interpretation               SEE NOTE     Normal sigmoidal dissociation curve with normal p50.   Oxygen Dissociation P50, RBC     28              mm Hg  24-30   P50 Shipping Control Vial        Received       Test Performed by:     [address] [address]     : Mau Jc II, M.D., Ph.D.  CORRECTED ON 02/28 AT 1731: PREVIOUSLY REPORTED AS SEE NOTE 10/20/2016 06:24 PM      Test Name P50 BY OXYGEN DISSOCIATION RBC     Send Outs Misc Test Code P50B     Send Outs Misc Test Specimen Whole Blood

## 2017-09-22 NOTE — PATIENT INSTRUCTIONS
-- we agree with the stretching exercises as well as the pool therapy.  -- we will check some labs today  -- xrays for the hands and the feet  -- see you again in 1 year.

## 2017-09-22 NOTE — PROGRESS NOTES
[facility] - Rheumatology Clinic Visit     Alise Colon MRN# [id number]   YOB: 1945    Primary care provider: Ana Rosa Lima  Date of Visit: 9/22/2017  Last Seen: 8/1/2016          Assessment and Plan:   # Subclinical Rheumatoid Arthritis (strongly positive ACPA and mildly positive RF) onset at least 2009  # Paresthesia- chronic  # Family history of chron's disease  # DVT 2011  # H/o NHL 1999; s/p CHOP; no radiotherapy;  # Erythrocytosis- hematology evaluation 2016  # Vitamin D deficiency - currently ongoing replacement with PCP    This is the fourth rheumatology opinion/consultation in the last three to four years.   Last visit Ms. Colon brought the results of [place] [facility] lyme's test in which three bands were positive (2016). However lyme's screening was negative in 2014.Also negative in our lab. Patient was reassured.   Seropositive and likely subclinical rheumatoid arthritis is the diagnosis at this point of time. Patient was advised though that it could accelerate at some point in the future to become a clear-cut seropositive rheumatoid arthritis. Previous Prednisone trial without any significant subjective changes. We would not initiate any specific treatment for positive serologies alone as there is no synovitis at this time and patient does not seem to be interested in any DMARD. In addition there are no definite erosive changes on the recent Hands XR.     The most bothering symptom for the patient is still burning sensation in hands and feet. We believe it is likely due to peripheral neuropathy, etiology is still unknown. Not previously associated with her chemotherapy.  No evidence of Polycythemia vera.    We would like to see Ms. Colon in one year.  Repeat hand and foot x-ray today.  Repeat CCP and RF labs.    The patient was seen and discussed with Dr. Keating who agrees with the above assessment and plan.     Dylon Bennett   Rheumatology  Fellow    Attending Note: I saw and evaluated the patient with Dr. Bennett. I agree with the assessment and plan.    Brigitte Keating MD    Orders Placed This Encounter   Procedures     X-ray bl Foot 3+ views     XR Hand Bilateral G/E 3 Views     Cyclic Citrullinated Peptide Antibody IgG     Rheumatoid factor     ESR     CRP inflammation     Vitamin B12     Anti Nuclear Cha IgG by IFA with Reflex     SSA Ro INES Antibody IgG     SSB La INES Antibody IgG                 Active Problem List:     Patient Active Problem List    Diagnosis Date Noted     Polycythemia 10/19/2016     Priority: Medium     Major depressive disorder 01/19/2016     Priority: Medium     Headache 02/05/2015     Priority: Medium     Problem list name updated by automated process. Provider to review              History of Present Illness:     No chief complaint on file.  Initially seen July 6, 2016     Ms. Colon is a 70 year old lady referred for second opinion on RA diagnosis and management.    Current joint pain history:   Joint pain history:   Onset: 2009; worse in the last 1.5 years  Involved joints: MTPs, wrists, knees, ankles   Pain scale: 8/10 in AM; all these areas worse in AM     Wakes the patient from sleep : sometimes  Morning stiffness : 3 hours  Meds used: plaquenil made tinnitus worse;     Myalgias in calves +ve  Daughter has chron's disease    DVT 2011  H/o NHL 1999; s/p CHOP; no radiotherapy;    No h/o unintentional weight loss, loss of appetite, fevers, chills, rash, swollen glands  No h/o gout  No family or personal history of psoriasis, ulcerative colitis or chron's disease. No h/o sausage digits, iritis. No enthesitis, plantar fascitis or heel pain.   Patient denies any raynauds, sicca symptoms,  No h/o persistent shortness of breath, cough, chest pain  No h/o persistent nausea, vomiting, constipation, diarrhea, abdominal pain  No h/o hematochezia, hematuria, hemoptysis, hematemesis  No h/o seizures or strokes    CCP > 250  SANFORD  "homogenous 1:80  Mildly positive RF  Lyme negative.   CRP borderline elevation +ve    Right foot films ordered and independently interpreted.   Indication: Right foot pain.   Assessment: Significant hallux valgus and 1st MTP arthritis, otherwise unremarkable.     Left foot films ordered and independently interpreted.   Indication: Left foot pain   Assessment: Fusion of the great toe, crossing screws in place, no acute findings.    Interim history 8/1/16:  CCP 89 (trended down)  SANFORD homogenous: negative (trended down)  RF: negative (trended down)  Lyme serology: negative.   CRP borderline elevation +ve: 8.3  Sed Rate: negative  Hep C: equivocal results, will repeat Hep C antibody today    Patient's main complain is numbing, burning and aching sensation in her feet and calfes right after awakening in the morning. She does not have a morning stiffness as such but muscle pain. 8/10. However it takes her up to 1 hour to get back to her baseline (which is an improvement from 3 hours before). She does not have any stiffness in her hands. He sis not experienced any changes while taking Prednisone for 2 weeks (10 mg daily for 14 days). She states that her muscle \"pain\" (eg, burning sensation) is alleviated by hot tube use.     Interim History 8/1/2016 - 9/22/2017:  Patient returns continued joint pains and paresthesias does not developed any morning stiffness of her hands.  She mentions that the pain she gets is more constant and did not previously respond to prednisone trials.  The sore and burning sensations will occasionally move from her toes to her knees to her fingers to her wrists and ankles.  She feels like she will have neck creaks and cracks.  Denies any fevers chills no lymphadenopathy or unintended weight loss.  Denies any hot or swollen joints with her pains.         Review of Systems:   Complete ROS negative except for symptoms mentioned in the HPI          Past Medical History:     Past Medical History: "   Diagnosis Date     Back pain      Depressive disorder      Fibromyalgia age 43     Hypertension      Lymphoma (H) 1999    started chemo     Past Surgical History:   Procedure Laterality Date     COLONOSCOPY  9/18/2012    Procedure: COLONOSCOPY;  COLONOSCOPY ;  Surgeon: Evan Hays MD;  Location:  GI     KNEE SURGERY      left     MAMMOPLASTY REDUCTION       ORTHOPEDIC SURGERY  2012    toe fused     ORTHOPEDIC SURGERY  2011    knee arthroscopy     TOE SURGERY      left big toe     TONSILLECTOMY              Social History:     Social History     Occupational History     Not on file.     Social History Main Topics     Smoking status: Former Smoker     Packs/day: 1.00     Years: 20.00     Quit date: 1/1/1996     Smokeless tobacco: Not on file     Alcohol use No     Drug use: No     Sexual activity: Not on file            Family History:     Family History   Problem Relation Age of Onset     Anxiety Disorder Mother      Dementia Mother 88            Allergies:     Allergies   Allergen Reactions     Morphine Other (See Comments)     Severe agitation     Hydrocodone-Acetaminophen Nausea     Sertraline Other (See Comments)     Head ache     Cephalexin Hcl      Ketorolac      Lisinopril      Percocet [Oxycodone-Acetaminophen]      Plaquenil [Hydroxychloroquine]      Worsening tinnitus  Worsening tinnitus     Remeron [Mirtazapine] Swelling     Ankle edema     Toradol      Redness, numbness in hands            Medications:     Current Outpatient Prescriptions   Medication Sig Dispense Refill     PROPRANOLOL HCL PO Take 60 mg by mouth daily       ALPHA LIPOIC ACID PO Take 200 mg by mouth daily       gabapentin (NEURONTIN) 300 MG capsule 600 mg daily        omeprazole (PRILOSEC) 10 MG capsule Take 10 mg by mouth       ketoconazole (NIZORAL) 2 % cream shampoo       losartan-hydrochlorothiazide (HYZAAR) 100-25 MG per tablet Take 0.5 tablets by mouth daily       VITAMIN D, CHOLECALCIFEROL, PO Take 2,000 Units by mouth  "daily               Physical Exam:   Blood pressure 148/86, pulse 55, height 1.651 m (5' 5\"), weight 90.6 kg (199 lb 12.8 oz), SpO2 94 %.  Wt Readings from Last 4 Encounters:   09/22/17 90.6 kg (199 lb 12.8 oz)   04/14/17 87.5 kg (192 lb 12.8 oz)   11/01/16 84.2 kg (185 lb 9.6 oz)   10/19/16 84.2 kg (185 lb 9.6 oz)     Constitutional: well-developed, appearing stated age; cooperative  Eyes: nl EOM, vision, conjunctiva, sclera  ENT: nl external ears, nose, hearing, lips, teeth, gums, throat  No mucous membrane lesions, normal saliva pool  Neck: no mass or thyroid enlargement  Resp: lungs clear to auscultation,   CV: RRR, no murmurs, rubs or gallops, no edema  GI: no ABD mass or tenderness, no HSM  : not tested  Lymph: no cervical, supraclavicular or epitrochlear nodes    MS: very minimal thickening noted in MCPs without tenderness. MTP tenderness present in the 1st CMC.   All shoulder, elbow, wrist, MCP/PIP/DIP, hip, knee, ankle, and foot MTP/IP joints were examined and  found normal. No active synovitis or deformity. Full ROM.  Fist 100%.  No dactylitis,  tenosynovitis, enthesopathy.  Skin: no nail pitting, alopecia, rash, nodules or lesions  Psych: nl judgement, orientation, memory, affect.         Data:     Results for orders placed or performed in visit on 10/19/16   CBC with platelets differential   Result Value Ref Range    WBC 6.4 4.0 - 11.0 10e9/L    RBC Count 5.97 (H) 3.8 - 5.2 10e12/L    Hemoglobin 14.8 11.7 - 15.7 g/dL    Hematocrit 46.2 35.0 - 47.0 %    MCV 77 (L) 78 - 100 fl    MCH 24.8 (L) 26.5 - 33.0 pg    MCHC 32.0 31.5 - 36.5 g/dL    RDW 13.8 10.0 - 15.0 %    Platelet Count 237 150 - 450 10e9/L    Diff Method Automated Method     % Neutrophils 59.5 %    % Lymphocytes 29.9 %    % Monocytes 8.1 %    % Eosinophils 1.7 %    % Basophils 0.6 %    % Immature Granulocytes 0.2 %    Nucleated RBCs 0 0 /100    Absolute Neutrophil 3.8 1.6 - 8.3 10e9/L    Absolute Lymphocytes 1.9 0.8 - 5.3 10e9/L    Absolute " Monocytes 0.5 0.0 - 1.3 10e9/L    Absolute Eosinophils 0.1 0.0 - 0.7 10e9/L    Absolute Basophils 0.0 0.0 - 0.2 10e9/L    Abs Immature Granulocytes 0.0 0 - 0.4 10e9/L    Absolute Nucleated RBC 0.0    Reticulocyte count   Result Value Ref Range    % Retic 0.9 0.5 - 2.0 %    Absolute Retic 53.1 25 - 95 10e9/L   Erythrocyte sedimentation rate auto   Result Value Ref Range    Sed Rate 13 0 - 30 mm/h   CRP inflammation   Result Value Ref Range    CRP Inflammation 5.8 0.0 - 8.0 mg/L   Bld morphology pathology review   Result Value Ref Range    Copath Report       Patient Name: ELSA MARTINEZ  MR#: [id number]  Specimen #: [id number]  Collected: 10/19/2016  Received: 10/19/2016  Reported: 10/20/2016 11:01  Ordering Phy(s): JOSE ALBERTO GAO    TEST(S):  Blood Smear Morphology    FINAL DIAGNOSIS:  Peripheral Blood Smear:  - Erythrocytosis (elevated RBC count with normal hemoglobin) with slight  microcytosis; minimal poikilocytosis  - Normal leukocyte count and differential  - Normal platelet count and morphology    I have personally reviewed all specimens and/or slides, including the  listed special stains, and used them with my medical judgment to  determine the final diagnosis.    Electronically signed out by:  Trupti George M.D. Ayushcatrina    Technical testing/processing performed at [place], [place]    CLINICAL HISTORY:  From [facility] electronic medical record: The patient is a 71 year old woman  with a history of erythrocytosis with microcytosis since 1997.  Peripheral smear revi ew requested for microcytosis.    MICROSCOPIC DESCRIPTION:  PERIPHERAL BLOOD DATA (Date: October 19, 2016)  Patient Value (Reference Range >18 year old female)  6.4 WBC (4.0-11.0 x 10*9/L)  5.97 RBC (3.8-5.2 x 10*12/L)  14.8 HGB (11.7-15.7 g/dL)  77 MCV (78-100fL)  32.0 MCHC (31.5-36.5 g/dL)  13.8 RDW (10.0-15.0 %)  237 PLT (150-450 x 10*9/L)  53.1 RETIC (25-95 x  10*9/L)    PERIPHERAL BLOOD DIFFERENTIAL (Automated )    (Reference ranges >18 year old female)  Percent  59.5% Neutrophils, segmented and bands  29.9% Lymphocytes  8.1% Monocytes  1.7% Eosinophils  0.6% Basophils  0.2% Neutrophil precursors    Absolute  3.8 Neutrophils, segmented and bands (1.6 - 8.3 x 10*9/L)  1.9 Lymphocytes (0.8 - 5.3 x 10*9/L)  0.5 Monocytes (0 -1.3 x 10*9/L)  0.1 Eosinophils (0 - 0.7 x 10*9/L)  0.0 Basophils (0 - 0.2 x 10*9/L)  0.0 Neutrophil precursors (0 - 0.4 x 10*9/L)    The red cells appear normochromic. Poikilocytosis is minimal.  Polychromasia is not increased. Rouleaux formation is not increased.  The  morphology of the platelets is normal. Lymphocytes appear polymorphous,  and neutrophils contain normal cytoplasmic granulation and unremarkable  nuclear morphology.    CPT Codes:  A: [id number]    TESTING LAB LOCATION:  [facility], [address]   [address]  [phone]    COLLECTION SITE:  Client:  [facility]  Location:  [facility] (B)     Protein electrophoresis   Result Value Ref Range    Albumin Fraction 3.8 3.7 - 5.1 g/dL    Alpha 1 Fraction 0.3 0.2 - 0.4 g/dL    Alpha 2 Fraction 0.7 0.5 - 0.9 g/dL    Beta Fraction 1.2 (H) 0.6 - 1.0 g/dL    Gamma Fraction 1.3 0.7 - 1.6 g/dL    Monoclonal Peak 0.0 0.0 g/dL    ELP Interpretation:       Increased beta fraction. No monoclonal protein seen. Pathologic significance   requires clinical correlation.  PAT Aragon M.D., Ph.D., Pathologist.     Comprehensive metabolic panel   Result Value Ref Range    Sodium 138 133 - 144 mmol/L    Potassium 4.0 3.4 - 5.3 mmol/L    Chloride 103 94 - 109 mmol/L    Carbon Dioxide 29 20 - 32 mmol/L    Anion Gap 6 3 - 14 mmol/L    Glucose 80 70 - 99 mg/dL    Urea Nitrogen 12 7 - 30 mg/dL    Creatinine 0.72 0.52 - 1.04 mg/dL    GFR Estimate 79 >60 mL/min/1.7m2    GFR Estimate If Black  >90   GFR Calc   >60 mL/min/1.7m2    Calcium 8.5 8.5 - 10.1 mg/dL    Bilirubin Total 0.6 0.2 - 1.3 mg/dL    Albumin 3.2 (L) 3.4 - 5.0 g/dL    Protein Total 7.5 6.8 - 8.8 g/dL    Alkaline Phosphatase 77 40 - 150 U/L    ALT 22 0 - 50 U/L    AST 19 0 - 45 U/L   Lactate Dehydrogenase   Result Value Ref Range    Lactate Dehydrogenase 191 81 - 234 U/L   Next Generation Sequencing Oncology: Myeloproliferative Neoplasm Panel   Result Value Ref Range    Copath Report       Patient Name: ELSA MARTINEZ  MR#: [id number]  Specimen #: [id number]  Collected: 10/19/2016 11:26  Received: 10/19/2016 14:29  Reported: 11/1/2016 18:40  Ordering Phy(s): JOSE ALBERTO GAO    _________________________________________    TEST(S) REQUESTED:  Next Generation Sequencing Oncology-MPN    SPECIMEN DESCRIPTION:  Blood    INTERPRETATION OF RESULTS    ---------------------------------------------------------------------  No mutations were identified in the analyzed regions of JAK2, CALR, or  MPL.    Nearly 100% of polycythemia vera (PV) cases have either JAK2 V617F or an  exon 12 mutation. As this patient's sample is negative for mutations in  these regions, PV is unlikely.    JAK2, CALR, or MPL mutations have also been reported in both essential  thrombocythemia (ET) and primary myelofibrosis (PMF) at variable  prevalence [1-3]. However, the absence of mutations does not exclude  these diagnoses as approximately 10-15% of ET or PMF may be negative for  mutations in these  3 genes.    Correlation with clinical information, morphology, and other diagnostic  tests is indicated.    References:  1. Luis Enrique SH, Dashawn E, Constantine NL, et al. WHO Classification of Tumours  of Haematopoietic and Lymphoid Tissues. [place], [place]: I[employer]; 2008.  2. Justin KNIGHT, Hannah RL, Lashon T, et al.  mutations in  myeloproliferative and other myeloid disorders: a study of 1182  patients. Blood. 2006;108(10):3472-6.  3. Kunal Cao  HARRY, Naye SANTOS et al. Somatic mutations of  calreticulin in myeloproliferative neoplasms. N Engl J Med.  2013;369(51):0409-74.    ---------------------------------------------------------------------    SIGNIFICANT ALTERATIONS    ---------------------------------------------------------------------  Detected Alterations of Known or Potential Pathogenicity: None  Detected Alterations of Uncertain Significance: None  ---------------------------------------------------------------------    GENETIC ALTERATIONS    ----------------- ----------------------------------------------------  Detected Alterations of Known or Potential Pathogenicity  ---------------------------------------------------------------------  None    ---------------------------------------------------------------------  Detected Alterations of Uncertain Significance  ---------------------------------------------------------------------  None    ---------------------------------------------------------------------  Genes with No Detected Alterations of the Amino Acid Sequence:  ---------------------------------------------------------------------  CALR, JAK2, MPL    ---------------------------------------------------------------------  *Therapeutic Implications: Associated with drug response = related to  drug sensitivity or resistance as described in Drug Response section of  this report; Potentially relevant clinical trials = gene is related to a  trial in the Clinical Trials section of this report COSMIC: Mutation ID  in the Catalogue Of Soma tic Mutations in Cancer  (http://cancer.elia.ac.uk/ [http://cancer.elia.ac.uk/]) Allele  Frequency: Allele frequency of the alteration in the 1000 Genomes  Project (http://www.1000genomes.org/ [http://www.1000genomes.org/])  dbSNP: RS number of alteration in dbSNP database  (http://www.ncbi.nlm.nih.gov/SNP  [http://www.ncbi.nlm.nih.gov/SNP])  ---------------------------------------------------------------------    TEST DETAILS    ---------------------------------------------------------------------  Methodology  ---------------------------------------------------------------------  Genomic DNA is extracted from the sample, and sequencing libraries are  prepared using an amplicon-based target enrichment method on the  [employer] Access Array System. The enriched DNA libraries are  sequenced on an Illumina MiSeq instrument (version 3 chemistry, 300 bp  paired end reads). FASTQ files are processed through a custom designed  bioinformatics pipeline termed Scan[employer]el (Jean Claude et al. [facility],  2015), which utilizes Cutadapt for adaptor trimming, BWA-MEM and BLAT  mapping algorithms for improved indel detection capability, and a  modified Freebayes algorithm for variant calling. The variant  call files (vcf) are filtered by a custom R-script to remove  sub-threshold calls with less than 500X coverage and/or minor allele  frequency (MAF) less than thresholds defined for single nucleotide  variants (5-10%) and insertion/deletion variants (1-5%). Clinically  relevant mutations from this filtered variant list are annotated by a  pathologist with [employer] software and reported. The assay bed file  and 5% SNV hotspot list are available upon request. Sequenced regions of  the clinically ordered gene set are identified in the table below.    Gene(NM Reference): Exons covered  CALR (NM_004343.3): 9; JAK2 (NM_004972.3): 12,14; MPL (NM_005373.1): 10    Less than 500X coverage was achieved in portions of the following exons;  additional mutations in these regions can not be entirely excluded: None    ---------------------------------------------------------------------  Limitations  ---------------------------------------------------------------------  The validated allele frequency thresholds for reporting positive  results  range from 1% to 10% dependent on the type of mutation as follows: 5%  for single nucleotide variants of high clinical utility (list available  upon request); 10% for all other single nucleotide variants; 1% for  insertion-deletion mutations > than 3bp, and 5% for insertion-deletion  mutations 3bp or smaller. Therefore, the tumor cell population must  comprise at least 10-20% of the submitted specimen. Specimens with  borderline tumor cellularity may lead to false negative results. Caution  is advised for the interpretation of negative results in these  situations, and correlation with  morphology and other laboratory results is recommended to ensure  adequate representation of the cell population of interest.  ---------------- -----------------------------------------------------  Disclaimer  ---------------------------------------------------------------------  This test was developed and its performance characteristics determined  by the [facility], Molecular Diagnostics  Laboratory. It has not been cleared or approved by the FDA. The  laboratory is regulated under CLIA as qualified to perform  high-complexity testing. This test is used for clinical purposes. It  should not be regarded as investigational or for research.    ---------------------------------------------------------------------  [employer] Disclaimer  ---------------------------------------------------------------------  This report was produced using software licensed by [employer].  [employer] software is designed to be used in clinical applications  solely as a tool to enhance medical utility and improve operational  efficiency. The use of [employer] software is not a substitute for  medical judgmen t and [employer] in no way holds itself out as having  or providing independent medical judgment or diagnostic services.  [employer] is not liable with respect to any treatment or diagnosis  made  in connection with this report.    ---------------------------------------------------------------------  Electronic Signature  ---------------------------------------------------------------------  Electronically signed by: Nevaeh Scott  11/01/2016    Electronically Signed Out By:  Nevaeh Scott M.D., [facility]    CPT Codes:  A: -YUHJQI, [id number], [id number], CALCCALRPNGSTC2    TESTING LAB LOCATION:  [address] [address] [phone]    COLLECTION SITE:  Client:  [facility]  [facility]  [facility]     [facility]   Result Value Ref Range    Result       SEE NOTE 02/28/2017 05:31 PM  (Note)    Test                               Result    Flag  Unit   RefValue  ------------------------------------------------------------------  Oxygen Dissociation P50, RBC   P50 Interpretation               SEE NOTE     Normal sigmoidal dissociation curve with normal p50.   Oxygen Dissociation P50, RBC     28              mm Hg  24-30   P50 Shipping Control Vial        Received       Test Performed by:     [address] [address]     : Mau Jc II, M.D., Ph.D.  CORRECTED ON 02/28 AT 1731: PREVIOUSLY REPORTED AS SEE NOTE 10/20/2016 06:24 PM      Test Name P50 BY OXYGEN DISSOCIATION RBC     Send Outs Misc Test Code P50B     Send Outs Misc Test Specimen Whole Blood

## 2017-09-22 NOTE — PROGRESS NOTES
[facility] - Rheumatology Clinic Visit     Alise Colon MRN# [id number]   YOB: 1945    Primary care provider: Ana Rosa Lima  Sep 22, 2017          Assessment and Plan:   # Rheumatoid arthritis (strongly positive ACPA and mildly positive RF) onset at least 2009  # Paresthesia- chronic  # Family history of chron's disease  # DVT 2011  # H/o NHL 1999; s/p CHOP; no radiotherapy;  # Erythrocytosis- hematology evaluation 2016  # Vitamin D deficiency - currently ongoing replacement with PCP    This is the fourth rheumatology opinion/consultation in the last three to four years.   Last visit Ms. Colon brought the results of [place] igenex lyme's test in which three bands were positive (2016). However lyme's screening was negative in 2014.Also negative in our lab. Patient was reassured.   Seropositive possible subclinical rheumatoid arthritis is the diagnosis at this point of time. Patient was advised though that it could accelerate at some point in the future to become a clear-cut seropositive rheumatoid arthritis. Recent Prednisone trial without any significant subjective changes however improved morning stiffness from 3 hours to 1 hour only as per our records. We would not initiate any specific treatment for positive serologies alone as there is no synovitis at this time and patient does not seem to be interested in any DMARD. In addition there are no definite erosive changes on the recent Hands XR.     The most bothering symptom for the patient is still burning sensation in hands and feet. We believe it is likely due to peripheral neuropathy, etiology is to determine per PCP. Patient states that there is a pending test to rule out Polycythemia vera. We recommend Gabapentin trial per PCP, patient agreed with this plan.    Hep C serology came back equivocal, so we will repeat this test today. If the test will come back equivocal again we will proceed with Hep C viral  load but very low suspicion for Hep C.    We would like to see Ms. Colon in one year. At this time we would like to repeat a hand xray and perform a feet XR.     The labs, imaging from patient records are reviewed.     Data Unavailable    No orders of the defined types were placed in this encounter.      Medications Discontinued During This Encounter   Medication Reason     OMEPRAZOLE PO Medication Reconciliation Clean Up     ASPIRIN PO Therapy completed     fluocinolone acetonide 0.01 % OIL Therapy completed     busPIRone (BUSPAR) 5 MG tablet Therapy completed     hydrOXYzine (ATARAX) 25 MG tablet Medication Reconciliation Clean Up     HydrOXYzine Pamoate (VISTARIL PO) Therapy completed     nortriptyline (PAMELOR) 10 MG capsule Therapy completed     Current Outpatient Prescriptions   Medication Sig Dispense Refill     PROPRANOLOL HCL PO Take 60 mg by mouth daily       ALPHA LIPOIC ACID PO Take 200 mg by mouth daily       gabapentin (NEURONTIN) 300 MG capsule 600 mg daily        omeprazole (PRILOSEC) 10 MG capsule Take 10 mg by mouth       ketoconazole (NIZORAL) 2 % cream shampoo       losartan-hydrochlorothiazide (HYZAAR) 100-25 MG per tablet Take 0.5 tablets by mouth daily       VITAMIN D, CHOLECALCIFEROL, PO Take 2,000 Units by mouth daily                Active Problem List:     Patient Active Problem List    Diagnosis Date Noted     Polycythemia 10/19/2016     Priority: Medium     Major depressive disorder 01/19/2016     Priority: Medium     Headache 02/05/2015     Priority: Medium     Problem list name updated by automated process. Provider to review              History of Present Illness:     No chief complaint on file.  July 6, 2016     Ms. Colon is a 70 year old lady referred for second opinion on RA diagnosis and management    Current joint pain history:   Joint pain history:   Onset: 2009; worse in the last 1.5 years  Involved joints: MTPs, wrists, knees, ankles   Pain scale: 8/10 in AM; all these areas  worse in AM     Wakes the patient from sleep : sometimes  Morning stiffness : 3 hours  Meds used: plaquenil made tinnitus worse;     Myalgias in calves +ve    Daughter has chron's disease    DVT 2011    H/o NHL 1999; s/p CHOP; no radiotherapy;    No h/o unintentional weight loss, loss of appetite, fevers, chills, rash, swollen glands  No h/o gout  No family or personal history of psoriasis, ulcerative colitis or chron's disease. No h/o sausage digits, iritis. No enthesitis, plantar fascitis or heel pain.   Patient denies any raynauds, sicca symptoms,  No h/o persistent shortness of breath, cough, chest pain  No h/o persistent nausea, vomiting, constipation, diarrhea, abdominal pain  No h/o hematochezia, hematuria, hemoptysis, hematemesis  No h/o seizures or strokes    CCP > 250  SANFORD homogenous 1:80  Mildly positive RF  Lyme negative.   CRP borderline elevation +ve    Right foot films ordered and independently interpreted.   Indication: Right foot pain.   Assessment: Significant hallux valgus and 1st MTP arthritis, otherwise unremarkable.     Left foot films ordered and independently interpreted.   Indication: Left foot pain   Assessment: Fusion of the great toe, crossing screws in place, no acute findings.    Interim history 8/1/16:  CCP 89 (trended down)  SANFORD homogenous: negative (trended down)  RF: negative (trended down)  Lyme serology: negative.   CRP borderline elevation +ve: 8.3  Sed Rate: negative  Hep C: equivocal results, will repeat Hep C antibody today    Patient's main complain is numbing, burning and aching sensation in her feet and calfes right after awakening in the morning. She does not have a morning stiffness as such but muscle pain. 8/10. However it takes her up to 1 hour to get back to her baseline (which is an improvement from 3 hours before). She does not have any stiffness in her hands. He sis not experienced any changes while taking Prednisone for 2 weeks (10 mg daily for 14 days). She states  "that her muscle \"pain\" (eg, burning sensation) is alleviated by hot tube use.     BP Readings from Last 3 Encounters:   09/22/17 148/86   04/14/17 142/88   11/01/16 (!) 170/97            Review of Systems:   Complete ROS negative except for symptoms mentioned in the HPI          Past Medical History:     Past Medical History:   Diagnosis Date     Back pain      Depressive disorder      Fibromyalgia age 43     Hypertension      Lymphoma (H) 1999    started chemo     Past Surgical History:   Procedure Laterality Date     COLONOSCOPY  9/18/2012    Procedure: COLONOSCOPY;  COLONOSCOPY ;  Surgeon: Evan Hays MD;  Location:  GI     KNEE SURGERY      left     MAMMOPLASTY REDUCTION       ORTHOPEDIC SURGERY  2012    toe fused     ORTHOPEDIC SURGERY  2011    knee arthroscopy     TOE SURGERY      left big toe     TONSILLECTOMY              Social History:     Social History     Occupational History     Not on file.     Social History Main Topics     Smoking status: Former Smoker     Packs/day: 1.00     Years: 20.00     Quit date: 1/1/1996     Smokeless tobacco: Not on file     Alcohol use No     Drug use: No     Sexual activity: Not on file            Family History:     Family History   Problem Relation Age of Onset     Anxiety Disorder Mother      Dementia Mother 88            Allergies:     Allergies   Allergen Reactions     Morphine Other (See Comments)     Severe agitation     Hydrocodone-Acetaminophen Nausea     Sertraline Other (See Comments)     Head ache     Cephalexin Hcl      Ketorolac      Lisinopril      Percocet [Oxycodone-Acetaminophen]      Plaquenil [Hydroxychloroquine]      Worsening tinnitus  Worsening tinnitus     Remeron [Mirtazapine] Swelling     Ankle edema     Toradol      Redness, numbness in hands            Medications:     Current Outpatient Prescriptions   Medication Sig Dispense Refill     PROPRANOLOL HCL PO Take 60 mg by mouth daily       ALPHA LIPOIC ACID PO Take 200 mg by mouth daily   " "    gabapentin (NEURONTIN) 300 MG capsule 600 mg daily        omeprazole (PRILOSEC) 10 MG capsule Take 10 mg by mouth       ketoconazole (NIZORAL) 2 % cream shampoo       losartan-hydrochlorothiazide (HYZAAR) 100-25 MG per tablet Take 0.5 tablets by mouth daily       VITAMIN D, CHOLECALCIFEROL, PO Take 2,000 Units by mouth daily               Physical Exam:   Blood pressure 148/86, pulse 55, height 1.651 m (5' 5\"), weight 90.6 kg (199 lb 12.8 oz), SpO2 94 %.  Wt Readings from Last 4 Encounters:   09/22/17 90.6 kg (199 lb 12.8 oz)   04/14/17 87.5 kg (192 lb 12.8 oz)   11/01/16 84.2 kg (185 lb 9.6 oz)   10/19/16 84.2 kg (185 lb 9.6 oz)     Constitutional: well-developed, appearing stated age; cooperative  Eyes: nl EOM, vision, conjunctiva, sclera  ENT: nl external ears, nose, hearing, lips, teeth, gums, throat  No mucous membrane lesions, normal saliva pool  Neck: no mass or thyroid enlargement  Resp: lungs clear to auscultation,   CV: RRR, no murmurs, rubs or gallops, no edema  GI: no ABD mass or tenderness, no HSM  : not tested  Lymph: no cervical, supraclavicular or epitrochlear nodes    MS: very minimal thickening noted in MCPs without tenderness. MTP tenderness present in the 1st MTP.   All shoulder, elbow, wrist, MCP/PIP/DIP, hip, knee, ankle, and foot MTP/IP joints were examined and  found normal. No active synovitis or deformity. Full ROM.  Fist 100%.  No dactylitis,  tenosynovitis, enthesopathy.  Skin: no nail pitting, alopecia, rash, nodules or lesions  Psych: nl judgement, orientation, memory, affect.         Data:     Results for orders placed or performed in visit on 10/19/16   CBC with platelets differential   Result Value Ref Range    WBC 6.4 4.0 - 11.0 10e9/L    RBC Count 5.97 (H) 3.8 - 5.2 10e12/L    Hemoglobin 14.8 11.7 - 15.7 g/dL    Hematocrit 46.2 35.0 - 47.0 %    MCV 77 (L) 78 - 100 fl    MCH 24.8 (L) 26.5 - 33.0 pg    MCHC 32.0 31.5 - 36.5 g/dL    RDW 13.8 10.0 - 15.0 %    Platelet Count 237 " 150 - 450 10e9/L    Diff Method Automated Method     % Neutrophils 59.5 %    % Lymphocytes 29.9 %    % Monocytes 8.1 %    % Eosinophils 1.7 %    % Basophils 0.6 %    % Immature Granulocytes 0.2 %    Nucleated RBCs 0 0 /100    Absolute Neutrophil 3.8 1.6 - 8.3 10e9/L    Absolute Lymphocytes 1.9 0.8 - 5.3 10e9/L    Absolute Monocytes 0.5 0.0 - 1.3 10e9/L    Absolute Eosinophils 0.1 0.0 - 0.7 10e9/L    Absolute Basophils 0.0 0.0 - 0.2 10e9/L    Abs Immature Granulocytes 0.0 0 - 0.4 10e9/L    Absolute Nucleated RBC 0.0    Reticulocyte count   Result Value Ref Range    % Retic 0.9 0.5 - 2.0 %    Absolute Retic 53.1 25 - 95 10e9/L   Erythrocyte sedimentation rate auto   Result Value Ref Range    Sed Rate 13 0 - 30 mm/h   CRP inflammation   Result Value Ref Range    CRP Inflammation 5.8 0.0 - 8.0 mg/L   Bld morphology pathology review   Result Value Ref Range    Copath Report       Patient Name: ELSA MARTINEZ  MR#: [id number]  Specimen #: [id number]  Collected: 10/19/2016  Received: 10/19/2016  Reported: 10/20/2016 11:01  Ordering Phy(s): JOSE ALBERTO GAO    TEST(S):  Blood Smear Morphology    FINAL DIAGNOSIS:  Peripheral Blood Smear:  - Erythrocytosis (elevated RBC count with normal hemoglobin) with slight  microcytosis; minimal poikilocytosis  - Normal leukocyte count and differential  - Normal platelet count and morphology    I have personally reviewed all specimens and/or slides, including the  listed special stains, and used them with my medical judgment to  determine the final diagnosis.    Electronically signed out by:  Trupti George M.D. [facility]    Technical testing/processing performed at [place], [place]    CLINICAL HISTORY:  From [facility] electronic [facility] record: The patient is a 71 year old woman  with a history of erythrocytosis with microcytosis since 1997.  Peripheral smear revi ew requested for microcytosis.    MICROSCOPIC  DESCRIPTION:  PERIPHERAL BLOOD DATA (Date: October 19, 2016)  Patient Value (Reference Range >18 year old female)  6.4 WBC (4.0-11.0 x 10*9/L)  5.97 RBC (3.8-5.2 x 10*12/L)  14.8 HGB (11.7-15.7 g/dL)  77 MCV (78-100fL)  32.0 MCHC (31.5-36.5 g/dL)  13.8 RDW (10.0-15.0 %)  237 PLT (150-450 x 10*9/L)  53.1 RETIC (25-95 x 10*9/L)    PERIPHERAL BLOOD DIFFERENTIAL (Automated )    (Reference ranges >18 year old female)  Percent  59.5% Neutrophils, segmented and bands  29.9% Lymphocytes  8.1% Monocytes  1.7% Eosinophils  0.6% Basophils  0.2% Neutrophil precursors    Absolute  3.8 Neutrophils, segmented and bands (1.6 - 8.3 x 10*9/L)  1.9 Lymphocytes (0.8 - 5.3 x 10*9/L)  0.5 Monocytes (0 -1.3 x 10*9/L)  0.1 Eosinophils (0 - 0.7 x 10*9/L)  0.0 Basophils (0 - 0.2 x 10*9/L)  0.0 Neutrophil precursors (0 - 0.4 x 10*9/L)    The red cells appear normochromic. Poikilocytosis is minimal.  Polychromasia is not increased. Rouleaux formation is not increased.  The  morphology of the platelets is normal. Lymphocytes appear polymorphous,  and neutrophils contain normal cytoplasmic granulation and unremarkable  nuclear morphology.    CPT Codes:  A: 47229-MTBZY    TESTING LAB LOCATION:  [facility] [address] [place] [address]   [address]  [phone]    COLLECTION SITE:  Client:  [facility]  Location:  [facility] (B)     Protein electrophoresis   Result Value Ref Range    Albumin Fraction 3.8 3.7 - 5.1 g/dL    Alpha 1 Fraction 0.3 0.2 - 0.4 g/dL    Alpha 2 Fraction 0.7 0.5 - 0.9 g/dL    Beta Fraction 1.2 (H) 0.6 - 1.0 g/dL    Gamma Fraction 1.3 0.7 - 1.6 g/dL    Monoclonal Peak 0.0 0.0 g/dL    ELP Interpretation:       Increased beta fraction. No monoclonal protein seen. Pathologic significance   requires clinical correlation.  PAT Aragon M.D., Ph.D., Pathologist.     Comprehensive metabolic panel   Result Value Ref Range    Sodium  138 133 - 144 mmol/L    Potassium 4.0 3.4 - 5.3 mmol/L    Chloride 103 94 - 109 mmol/L    Carbon Dioxide 29 20 - 32 mmol/L    Anion Gap 6 3 - 14 mmol/L    Glucose 80 70 - 99 mg/dL    Urea Nitrogen 12 7 - 30 mg/dL    Creatinine 0.72 0.52 - 1.04 mg/dL    GFR Estimate 79 >60 mL/min/1.7m2    GFR Estimate If Black >90   GFR Calc   >60 mL/min/1.7m2    Calcium 8.5 8.5 - 10.1 mg/dL    Bilirubin Total 0.6 0.2 - 1.3 mg/dL    Albumin 3.2 (L) 3.4 - 5.0 g/dL    Protein Total 7.5 6.8 - 8.8 g/dL    Alkaline Phosphatase 77 40 - 150 U/L    ALT 22 0 - 50 U/L    AST 19 0 - 45 U/L   Lactate Dehydrogenase   Result Value Ref Range    Lactate Dehydrogenase 191 81 - 234 U/L   Next Generation Sequencing Oncology: Myeloproliferative Neoplasm Panel   Result Value Ref Range    Copath Report       Patient Name: ELSA MARTINEZ  MR#: [id number]  Specimen #: [id number]  Collected: 10/19/2016 11:26  Received: 10/19/2016 14:29  Reported: 11/1/2016 18:40  Ordering Phy(s): JOSE ALBERTO GAO    _________________________________________    TEST(S) REQUESTED:  Next Generation Sequencing Oncology-MPN    SPECIMEN DESCRIPTION:  Blood    INTERPRETATION OF RESULTS    ---------------------------------------------------------------------  No mutations were identified in the analyzed regions of JAK2, CALR, or  MPL.    Nearly 100% of polycythemia vera (PV) cases have either JAK2 V617F or an  exon 12 mutation. As this patient's sample is negative for mutations in  these regions, PV is unlikely.    JAK2, CALR, or MPL mutations have also been reported in both essential  thrombocythemia (ET) and primary myelofibrosis (PMF) at variable  prevalence [1-3]. However, the absence of mutations does not exclude  these diagnoses as approximately 10-15% of ET or PMF may be negative for  mutations in these  3 genes.    Correlation with clinical information, morphology, and other diagnostic  tests is indicated.    References:  1. Luis Enrique FRASER, Dashawn MCCARTHY,  Constantine BEAULIEU, et al. WHO Classification of Tumours  of Haematopoietic and Lymphoid Tissues. [place], [place]: [employer]; 2008.  2. Justin AD, Hannah RL, Lashon T, et al.  mutations in  myeloproliferative and other myeloid disorders: a study of 1182  patients. Blood. 2006;108(10):3472-6.  3. Renny T, Kunal H, Naye AS, et al. Somatic mutations of  calreticulin in myeloproliferative neoplasms. N Engl J Med.  2013;369(62):6770-85.    ---------------------------------------------------------------------    SIGNIFICANT ALTERATIONS    ---------------------------------------------------------------------  Detected Alterations of Known or Potential Pathogenicity: None  Detected Alterations of Uncertain Significance: None  ---------------------------------------------------------------------    GENETIC ALTERATIONS    ----------------- ----------------------------------------------------  Detected Alterations of Known or Potential Pathogenicity  ---------------------------------------------------------------------  None    ---------------------------------------------------------------------  Detected Alterations of Uncertain Significance  ---------------------------------------------------------------------  None    ---------------------------------------------------------------------  Genes with No Detected Alterations of the Amino Acid Sequence:  ---------------------------------------------------------------------  CALR, JAK2, MPL    ---------------------------------------------------------------------  *Therapeutic Implications: Associated with drug response = related to  drug sensitivity or resistance as described in Drug Response section of  this report; Potentially relevant clinical trials = gene is related to a  trial in the Clinical Trials section of this report COSMIC: Mutation ID  in the Catalogue Of Soma tic Mutations in Cancer  (http://cancer.elia.ac.uk/ [http://cancer.elia.ac.uk/])  Allele  Frequency: Allele frequency of the alteration in the 1000 Genomes  Project (http://www.1000genomes.org/ [http://www.1000genomes.org/])  dbSNP: RS number of alteration in dbSNP database  (http://www.ncbi.nlm.nih.gov/SNP [http://www.ncbi.nlm.nih.gov/SNP])  ---------------------------------------------------------------------    TEST DETAILS    ---------------------------------------------------------------------  Methodology  ---------------------------------------------------------------------  Genomic DNA is extracted from the sample, and sequencing libraries are  prepared using an amplicon-based target enrichment method on the  [employer] Access Array System. The enriched DNA libraries are  sequenced on an Illumina MiSeq instrument (version 3 chemistry, 300 bp  paired end reads). FASTQ files are processed through a custom designed  bioinformatics pipeline termed Scanindel (Jean Claude et al. [facility],  2015), which utilizes Cutadapt for adaptor trimming, BWA-MEM and BLAT  mapping algorithms for improved indel detection capability, and a  modified Freebayes algorithm for variant calling. The variant  call files (vcf) are filtered by a custom R-script to remove  sub-threshold calls with less than 500X coverage and/or minor allele  frequency (MAF) less than thresholds defined for single nucleotide  variants (5-10%) and insertion/deletion variants (1-5%). Clinically  relevant mutations from this filtered variant list are annotated by a  pathologist with [employer] software and reported. The assay bed file  and 5% SNV hotspot list are available upon request. Sequenced regions of  the clinically ordered gene set are identified in the table below.    Gene(NM Reference): Exons covered  CALR (NM_004343.3): 9; JAK2 (NM_004972.3): 12,14; MPL (NM_005373.1): 10    Less than 500X coverage was achieved in portions of the following exons;  additional mutations in these regions can not be entirely excluded:  None    ---------------------------------------------------------------------  Limitations  ---------------------------------------------------------------------  The validated allele frequency thresholds for reporting positive results  range from 1% to 10% dependent on the type of mutation as follows: 5%  for single nucleotide variants of high clinical utility (list available  upon request); 10% for all other single nucleotide variants; 1% for  insertion-deletion mutations > than 3bp, and 5% for insertion-deletion  mutations 3bp or smaller. Therefore, the tumor cell population must  comprise at least 10-20% of the submitted specimen. Specimens with  borderline tumor cellularity may lead to false negative results. Caution  is advised for the interpretation of negative results in these  situations, and correlation with  morphology and other laboratory results is recommended to ensure  adequate representation of the cell population of interest.  ---------------- -----------------------------------------------------  Disclaimer  ---------------------------------------------------------------------  This test was developed and its performance characteristics determined  by the [facility], [facility]  [facility]. It has not been cleared or approved by the FDA. The  laboratory is regulated under CLIA as qualified to perform  high-complexity testing. This test is used for clinical purposes. It  should not be regarded as investigational or for research.    ---------------------------------------------------------------------  [employer] Disclaimer  ---------------------------------------------------------------------  This report was produced using software licensed by [employer].  [employer] software is designed to be used in clinical applications  solely as a tool to enhance medical utility and improve operational  efficiency. The use of [employer] software is not a  substitute for  medical judgmen t and [facility] in no way holds itself out as having  or providing independent medical judgment or diagnostic services.  [facility] is not liable with respect to any treatment or diagnosis  made in connection with this report.    ---------------------------------------------------------------------  Electronic Signature  ---------------------------------------------------------------------  Electronically signed by: Nevaeh Scott  11/01/2016    Electronically Signed Out By:  Nevaeh Scott M.D., [facility]    CPT Codes:  A: -QOXMAC, [id number], MPLMPNPNGSOTC, CALCCALRPNGSTC2    TESTING LAB LOCATION:  [address] [address] [phone]    COLLECTION SITE:  Client:  [facility]  Location:  [facility] (B)     [place] Miscellaneous Test   Result Value Ref Range    Result       SEE NOTE 02/28/2017 05:31 PM  (Note)    Test                               Result    Flag  Unit   RefValue  ------------------------------------------------------------------  Oxygen Dissociation P50, RBC   P50 Interpretation               SEE NOTE     Normal sigmoidal dissociation curve with normal p50.   Oxygen Dissociation P50, RBC     28              mm Hg  24-30   P50 Shipping Control Vial        Received       Test Performed by:     [facility]     [address] [place] [address]     : Mau Jc II, M.D., Ph.D.  CORRECTED ON 02/28 AT 1731: PREVIOUSLY REPORTED AS SEE NOTE 10/20/2016 06:24 PM      Test Name P50 BY OXYGEN DISSOCIATION RBC     Send Outs Misc Test Code P50B     Send Outs Misc Test Specimen Whole Blood        Dr Lm MD  Clinical   Division of Rheumatology  [facility]  Phone (Patients line): [phone]  Pager (healthcare professionals only):  [phone]  Phone (healthcare professionals only line): [phone] (option 2)

## 2017-09-22 NOTE — NURSING NOTE
"No chief complaint on file.      Initial /86  Pulse 55  Ht 1.651 m (5' 5\")  Wt 90.6 kg (199 lb 12.8 oz)  SpO2 94%  BMI 33.25 kg/m2 Estimated body mass index is 33.25 kg/(m^2) as calculated from the following:    Height as of this encounter: 1.651 m (5' 5\").    Weight as of this encounter: 90.6 kg (199 lb 12.8 oz).  Medication Reconciliation: complete   Juliet Cummings CMA    "

## 2017-09-25 LAB
ANA SER QL IF: NEGATIVE
CCP AB SER IA-ACNC: 57 U/ML
ENA SS-A IGG SER IA-ACNC: <0.2 AI (ref 0–0.9)
ENA SS-B IGG SER IA-ACNC: <0.2 AI (ref 0–0.9)
RHEUMATOID FACT SER NEPH-ACNC: <20 IU/ML (ref 0–20)

## 2017-10-09 ENCOUNTER — TELEPHONE (OUTPATIENT)
Dept: RHEUMATOLOGY | Facility: CLINIC | Age: 72
End: 2017-10-09

## 2017-10-09 NOTE — TELEPHONE ENCOUNTER
Called patient and discussed the lab results as well as wrote letter for her documentation. She expressed understanding and all questions were answered.    Will close    Dylon Bennett

## 2017-10-09 NOTE — TELEPHONE ENCOUNTER
Pt requesting interpretation of 9/22/17 labs. Does not use [employer], asking to get paper copies w/ labs results, says previously received. Please advise at [phone]. Sent to [employer] pool.

## 2018-06-27 ENCOUNTER — MEDICAL CORRESPONDENCE (OUTPATIENT)
Dept: HEALTH INFORMATION MANAGEMENT | Facility: CLINIC | Age: 73
End: 2018-06-27

## 2018-07-02 ENCOUNTER — CARE COORDINATION (OUTPATIENT)
Dept: ONCOLOGY | Facility: CLINIC | Age: 73
End: 2018-07-02

## 2018-07-11 ENCOUNTER — CARE COORDINATION (OUTPATIENT)
Dept: ONCOLOGY | Facility: CLINIC | Age: 73
End: 2018-07-11

## 2018-07-11 NOTE — PROGRESS NOTES
"On 7/9/18, RN received phone message left by pt regarding a fax she sent the jprevious week.  Colleague sent RN e-mail of faxed letter from pt, sent on 7/5 at 5:44 pm.  Letter requesting confirmation of whether or not she has polycythemia.  Letter went on to say that although she received records she had requested in fax from 6/27/18, she could not find answer.  Today, 7/11/18, discussed pt's question of whether or not she has polycythemia vera with Dr. Raza Smith.  We reviewed the [employer] Oncology: Myeloproliferative Neoplasm Panel report which stated there were \"No mutations identified in the analyzed regions of JAK2, CALR, or MPL.\"   Report also said:  \"Nealry 100% of polycythemia vera (PV) cases have either [id number] or an exon 12 mutation.  As this patient's sample is negative for mutations in these regions, PV is unlikely.\"  Dr. Smith stated he considers this a definitive test and that she does not have PV.  RN left message for pt reviewing test name and results as above, as well as Dr. Smith's statement for pt.  Encouraged her to call back, via clinic nurse line, to discuss further with RN.    RN sent 7/5/18 fax to scanning.  "

## 2018-07-16 ENCOUNTER — CARE COORDINATION (OUTPATIENT)
Dept: ONCOLOGY | Facility: CLINIC | Age: 73
End: 2018-07-16

## 2018-07-16 NOTE — PROGRESS NOTES
Received call from pt today.  She stated she greatly appreciated all the care Dr. Raza Smith and author provided her regarding the question of ALEN Moreno.  Stated if her lymphoma were to come back she would come to our clinic for her care.

## 2019-09-25 ENCOUNTER — ANESTHESIA (OUTPATIENT)
Dept: SURGERY | Facility: CLINIC | Age: 74
End: 2019-09-25
Payer: MEDICARE

## 2019-09-25 ENCOUNTER — HOSPITAL ENCOUNTER (OUTPATIENT)
Facility: CLINIC | Age: 74
Discharge: HOME | End: 2019-09-25
Attending: COLON & RECTAL SURGERY | Admitting: COLON & RECTAL SURGERY
Payer: MEDICARE

## 2019-09-25 ENCOUNTER — ANESTHESIA EVENT (OUTPATIENT)
Dept: SURGERY | Facility: CLINIC | Age: 74
End: 2019-09-25
Payer: MEDICARE

## 2019-09-25 VITALS
HEART RATE: 63 BPM | WEIGHT: 188 LBS | TEMPERATURE: 97.7 F | HEIGHT: 64 IN | OXYGEN SATURATION: 95 % | DIASTOLIC BLOOD PRESSURE: 71 MMHG | SYSTOLIC BLOOD PRESSURE: 128 MMHG | RESPIRATION RATE: 16 BRPM | BODY MASS INDEX: 32.1 KG/M2

## 2019-09-25 DIAGNOSIS — K64.4 SKIN TAG OF ANUS: Primary | ICD-10-CM

## 2019-09-25 PROCEDURE — 36000050 ZZH SURGERY LEVEL 2 1ST 30 MIN: Performed by: COLON & RECTAL SURGERY

## 2019-09-25 PROCEDURE — 40000170 ZZH STATISTIC PRE-PROCEDURE ASSESSMENT II: Performed by: COLON & RECTAL SURGERY

## 2019-09-25 PROCEDURE — 25000566 ZZH SEVOFLURANE, EA 15 MIN: Performed by: COLON & RECTAL SURGERY

## 2019-09-25 PROCEDURE — 37000008 ZZH ANESTHESIA TECHNICAL FEE, 1ST 30 MIN: Performed by: COLON & RECTAL SURGERY

## 2019-09-25 PROCEDURE — 71000012 ZZH RECOVERY PHASE 1 LEVEL 1 FIRST HR: Performed by: COLON & RECTAL SURGERY

## 2019-09-25 PROCEDURE — 37000009 ZZH ANESTHESIA TECHNICAL FEE, EACH ADDTL 15 MIN: Performed by: COLON & RECTAL SURGERY

## 2019-09-25 PROCEDURE — 25000128 H RX IP 250 OP 636: Performed by: NURSE ANESTHETIST, CERTIFIED REGISTERED

## 2019-09-25 PROCEDURE — 25000128 H RX IP 250 OP 636: Performed by: COLON & RECTAL SURGERY

## 2019-09-25 PROCEDURE — 88304 TISSUE EXAM BY PATHOLOGIST: CPT | Mod: 26 | Performed by: COLON & RECTAL SURGERY

## 2019-09-25 PROCEDURE — 36000052 ZZH SURGERY LEVEL 2 EA 15 ADDTL MIN: Performed by: COLON & RECTAL SURGERY

## 2019-09-25 PROCEDURE — 88304 TISSUE EXAM BY PATHOLOGIST: CPT | Performed by: COLON & RECTAL SURGERY

## 2019-09-25 PROCEDURE — 25800030 ZZH RX IP 258 OP 636: Performed by: NURSE ANESTHETIST, CERTIFIED REGISTERED

## 2019-09-25 PROCEDURE — 71000027 ZZH RECOVERY PHASE 2 EACH 15 MINS: Performed by: COLON & RECTAL SURGERY

## 2019-09-25 PROCEDURE — 25000125 ZZHC RX 250: Performed by: NURSE ANESTHETIST, CERTIFIED REGISTERED

## 2019-09-25 PROCEDURE — 25000125 ZZHC RX 250: Performed by: COLON & RECTAL SURGERY

## 2019-09-25 PROCEDURE — 27210794 ZZH OR GENERAL SUPPLY STERILE: Performed by: COLON & RECTAL SURGERY

## 2019-09-25 RX ORDER — SODIUM CHLORIDE, SODIUM LACTATE, POTASSIUM CHLORIDE, CALCIUM CHLORIDE 600; 310; 30; 20 MG/100ML; MG/100ML; MG/100ML; MG/100ML
INJECTION, SOLUTION INTRAVENOUS CONTINUOUS
Status: DISCONTINUED | OUTPATIENT
Start: 2019-09-25 | End: 2019-09-25 | Stop reason: HOSPADM

## 2019-09-25 RX ORDER — PROPOFOL 10 MG/ML
INJECTION, EMULSION INTRAVENOUS CONTINUOUS PRN
Status: DISCONTINUED | OUTPATIENT
Start: 2019-09-25 | End: 2019-09-25

## 2019-09-25 RX ORDER — SODIUM CHLORIDE, SODIUM LACTATE, POTASSIUM CHLORIDE, CALCIUM CHLORIDE 600; 310; 30; 20 MG/100ML; MG/100ML; MG/100ML; MG/100ML
INJECTION, SOLUTION INTRAVENOUS CONTINUOUS PRN
Status: DISCONTINUED | OUTPATIENT
Start: 2019-09-25 | End: 2019-09-25

## 2019-09-25 RX ORDER — FENTANYL CITRATE 0.05 MG/ML
25-50 INJECTION, SOLUTION INTRAMUSCULAR; INTRAVENOUS
Status: DISCONTINUED | OUTPATIENT
Start: 2019-09-25 | End: 2019-09-25 | Stop reason: HOSPADM

## 2019-09-25 RX ORDER — ONDANSETRON 4 MG/1
4 TABLET, ORALLY DISINTEGRATING ORAL EVERY 30 MIN PRN
Status: DISCONTINUED | OUTPATIENT
Start: 2019-09-25 | End: 2019-09-25 | Stop reason: HOSPADM

## 2019-09-25 RX ORDER — NALOXONE HYDROCHLORIDE 0.4 MG/ML
.1-.4 INJECTION, SOLUTION INTRAMUSCULAR; INTRAVENOUS; SUBCUTANEOUS
Status: DISCONTINUED | OUTPATIENT
Start: 2019-09-25 | End: 2019-09-25 | Stop reason: HOSPADM

## 2019-09-25 RX ORDER — ACETAMINOPHEN 325 MG/1
650 TABLET ORAL
Status: DISCONTINUED | OUTPATIENT
Start: 2019-09-25 | End: 2019-09-25 | Stop reason: HOSPADM

## 2019-09-25 RX ORDER — MAGNESIUM HYDROXIDE 1200 MG/15ML
LIQUID ORAL PRN
Status: DISCONTINUED | OUTPATIENT
Start: 2019-09-25 | End: 2019-09-25 | Stop reason: HOSPADM

## 2019-09-25 RX ORDER — DIAZEPAM 2 MG
2-4 TABLET ORAL EVERY 6 HOURS PRN
Qty: 20 TABLET | Refills: 0 | Status: SHIPPED | OUTPATIENT
Start: 2019-09-25 | End: 2022-11-23

## 2019-09-25 RX ORDER — MEPERIDINE HYDROCHLORIDE 25 MG/ML
12.5 INJECTION INTRAMUSCULAR; INTRAVENOUS; SUBCUTANEOUS
Status: DISCONTINUED | OUTPATIENT
Start: 2019-09-25 | End: 2019-09-25 | Stop reason: HOSPADM

## 2019-09-25 RX ORDER — ONDANSETRON 2 MG/ML
4 INJECTION INTRAMUSCULAR; INTRAVENOUS EVERY 30 MIN PRN
Status: DISCONTINUED | OUTPATIENT
Start: 2019-09-25 | End: 2019-09-25 | Stop reason: HOSPADM

## 2019-09-25 RX ORDER — HYDROMORPHONE HYDROCHLORIDE 2 MG/1
2 TABLET ORAL EVERY 4 HOURS PRN
Qty: 20 TABLET | Refills: 0 | Status: SHIPPED | OUTPATIENT
Start: 2019-09-25 | End: 2022-11-23

## 2019-09-25 RX ORDER — BUPIVACAINE HYDROCHLORIDE 5 MG/ML
INJECTION, SOLUTION EPIDURAL; INTRACAUDAL
Status: DISCONTINUED
Start: 2019-09-25 | End: 2019-09-25 | Stop reason: HOSPADM

## 2019-09-25 RX ORDER — PROPOFOL 10 MG/ML
INJECTION, EMULSION INTRAVENOUS PRN
Status: DISCONTINUED | OUTPATIENT
Start: 2019-09-25 | End: 2019-09-25

## 2019-09-25 RX ORDER — ACETAMINOPHEN 500 MG
1000 TABLET ORAL EVERY 8 HOURS
Qty: 120 TABLET | Refills: 0 | Status: SHIPPED | OUTPATIENT
Start: 2019-09-25 | End: 2022-11-23

## 2019-09-25 RX ORDER — HYDROMORPHONE HYDROCHLORIDE 1 MG/ML
.3-.5 INJECTION, SOLUTION INTRAMUSCULAR; INTRAVENOUS; SUBCUTANEOUS EVERY 10 MIN PRN
Status: DISCONTINUED | OUTPATIENT
Start: 2019-09-25 | End: 2019-09-25 | Stop reason: HOSPADM

## 2019-09-25 RX ORDER — ONDANSETRON 2 MG/ML
INJECTION INTRAMUSCULAR; INTRAVENOUS PRN
Status: DISCONTINUED | OUTPATIENT
Start: 2019-09-25 | End: 2019-09-25

## 2019-09-25 RX ADMIN — PROPOFOL 30 MG: 10 INJECTION, EMULSION INTRAVENOUS at 07:37

## 2019-09-25 RX ADMIN — MIDAZOLAM 0.5 MG: 1 INJECTION INTRAMUSCULAR; INTRAVENOUS at 07:32

## 2019-09-25 RX ADMIN — ONDANSETRON 4 MG: 2 INJECTION INTRAMUSCULAR; INTRAVENOUS at 07:37

## 2019-09-25 RX ADMIN — PROPOFOL 50 MCG/KG/MIN: 10 INJECTION, EMULSION INTRAVENOUS at 07:37

## 2019-09-25 RX ADMIN — SODIUM CHLORIDE, POTASSIUM CHLORIDE, SODIUM LACTATE AND CALCIUM CHLORIDE: 600; 310; 30; 20 INJECTION, SOLUTION INTRAVENOUS at 07:32

## 2019-09-25 RX ADMIN — DEXMEDETOMIDINE HYDROCHLORIDE 8 MCG: 100 INJECTION, SOLUTION INTRAVENOUS at 07:32

## 2019-09-25 ASSESSMENT — MIFFLIN-ST. JEOR: SCORE: 1332.76

## 2019-09-25 ASSESSMENT — LIFESTYLE VARIABLES: TOBACCO_USE: 1

## 2019-09-25 ASSESSMENT — ENCOUNTER SYMPTOMS: SEIZURES: 0

## 2019-09-25 NOTE — ANESTHESIA PREPROCEDURE EVALUATION
Anesthesia Pre-Procedure Evaluation    Patient: Alise Colon   MRN: [id number] : 1945          Preoperative Diagnosis: ANAL SKIN TAG    Procedure(s):  EXAM UNDER ANESTHESIA, RECTUM, WITH REMOVAL OF ANAL SKIN TAG    Past Medical History:   Diagnosis Date     Back pain      Depressive disorder      Fibromyalgia age 43     Hypertension      Lymphoma (H) 1999    started chemo     Past Surgical History:   Procedure Laterality Date     COLONOSCOPY  2012    Procedure: COLONOSCOPY;  COLONOSCOPY ;  Surgeon: Evan Hays MD;  Location:  GI     KNEE SURGERY      left     MAMMOPLASTY REDUCTION       ORTHOPEDIC SURGERY      toe fused     ORTHOPEDIC SURGERY  2011    knee arthroscopy     TOE SURGERY      left big toe     TONSILLECTOMY         Anesthesia Evaluation     . Pt has had prior anesthetic.     No history of anesthetic complications          ROS/MED HX    ENT/Pulmonary:     (+)tobacco use, Past use , . .   (-) sleep apnea   Neurologic:     (+)migraines,    (-) seizures and CVA   Cardiovascular:     (+) Dyslipidemia, hypertension----. : . . . :. .       METS/Exercise Tolerance:     Hematologic:         Musculoskeletal:   (+) arthritis (RA),  -       GI/Hepatic:     (+) GERD      (-) liver disease   Renal/Genitourinary:      (-) renal disease   Endo:     (+) thyroid problem .   (-) Type II DM   Psychiatric:     (+) psychiatric history anxiety      Infectious Disease:         Malignancy:   (+) Malignancy History of Lymphoma/Leukemia          Other:                          Physical Exam  Normal systems: cardiovascular, pulmonary and dental    Airway   Mallampati: II  TM distance: >3 FB  Neck ROM: full    Dental     Cardiovascular       Pulmonary             Lab Results   Component Value Date    WBC 6.4 10/19/2016    HGB 14.8 10/19/2016    HCT 46.2 10/19/2016     10/19/2016    CRP 5.6 2017    SED 13 2017     10/19/2016    POTASSIUM 4.0 10/19/2016    CHLORIDE 103 10/19/2016  "   CO2 29 10/19/2016    BUN 12 10/19/2016    CR 0.72 10/19/2016    GLC 80 10/19/2016    PENELOPE 8.5 10/19/2016    ALBUMIN 3.2 (L) 10/19/2016    PROTTOTAL 7.5 10/19/2016    ALT 22 10/19/2016    AST 19 10/19/2016    ALKPHOS 77 10/19/2016    BILITOTAL 0.6 10/19/2016       Preop Vitals  BP Readings from Last 3 Encounters:   09/25/19 (!) 186/90   09/22/17 148/86   04/14/17 142/88    Pulse Readings from Last 3 Encounters:   09/22/17 55   04/14/17 92   11/01/16 78      Resp Readings from Last 3 Encounters:   09/25/19 18   11/01/16 18   10/19/16 16    SpO2 Readings from Last 3 Encounters:   09/25/19 95%   09/22/17 94%   04/14/17 96%      Temp Readings from Last 1 Encounters:   09/25/19 35.6  C (96  F) (Oral)    Ht Readings from Last 1 Encounters:   09/25/19 1.626 m (5' 4\")      Wt Readings from Last 1 Encounters:   09/25/19 85.3 kg (188 lb)    Estimated body mass index is 32.27 kg/m  as calculated from the following:    Height as of this encounter: 1.626 m (5' 4\").    Weight as of this encounter: 85.3 kg (188 lb).       Anesthesia Plan      History & Physical Review  History and physical reviewed and following examination; no interval change.    ASA Status:  2 .    NPO Status:  > 8 hours    Plan for MAC Reason for MAC:  Deep or markedly invasive procedure (G8)  PONV prophylaxis:  Ondansetron (or other 5HT-3)       Postoperative Care      Consents  Anesthetic plan, risks, benefits and alternatives discussed with:  Patient..                 Adal Willams MD  "

## 2019-09-25 NOTE — ANESTHESIA POSTPROCEDURE EVALUATION
Patient: Alise Colon    Procedure(s):  EXAM UNDER ANESTHESIA WITH REMOVAL OF ANAL SKIN TAG    Diagnosis:ANAL SKIN TAG  Diagnosis Additional Information: No value filed.    Anesthesia Type:  MAC    Note:  Anesthesia Post Evaluation    Patient location during evaluation: PACU  Patient participation: Able to fully participate in evaluation  Level of consciousness: awake and alert  Pain management: satisfactory to patient  Airway patency: patent  Cardiovascular status: hemodynamically stable  Respiratory status: acceptable and unassisted  Hydration status: balanced  PONV: none     Anesthetic complications: None          Last vitals:  Vitals:    09/25/19 0815 09/25/19 0830 09/25/19 0921   BP: 92/69 126/79 128/71   Pulse: 63     Resp: 12 14 16   Temp:      SpO2: 96% 95% 95%         Electronically Signed By: Adal Willams MD  September 25, 2019  2:34 PM

## 2019-09-25 NOTE — OP NOTE
OPERATIVE NOTE    PREOPERATIVE DIAGNOSIS: anterior perianal skin tag    POSTOPERATIVE DIAGNOSIS: same    OPERATION PERFORMED: Exam under anesthesia, excisional hemorrhoidectomy     SURGEON: Radha Mckeon MD    ANESTHESIA: MAC/Local    INDICATION:  Alise Colon is a 73 year old female who presented to clinic with a bothersome anterior skin tag that was causing her discomfort as well as issues with hygiene. The risks and benefits of anal skin tag removal were discussed and she presents today for excision.     OPERATIVE FINDINGS: medium sized anterior skin tag with grade II internal hemorrhoids    PROCEDURE IN DETAIL: After informed consent was obtained, the patient was brought to the operating room and placed in a well-padded prone jackknife position. MAC anesthesia was induced and the buttocks taped apart. The perineum was sterilely prepped and draped in standard fashion. An anal field block consisting of 40 mL 0.5% marcaine mixed 1:1 with 1% lidocaine with epinephrine was instilled into the perianal skin and then in the four quadrants of the anal canal for analgesia.   One external exam, a medium sized anterior skin tag was noted. No fissures, fistula or other abnormalities were noted. A digital rectal exam was performed to assess for pathology without any bleeding, palpable masses. . A Hanna bivalve retractor was then inserted into the anal canal to determine if there was an internal component to the anterior skin tag and assess for further anorectal pathology. The operative findings are noted above. The external skin tag was then grasped with an Allis clamp and the perianal skin was pulled laterally so as to create a kacy shape. The internal and external hemorrhoid was then excised using Bovie electrocautery, taking great care to preserve the underlying sphincter fibers at all times. Once the Bovie electrocautery had been used to excise the hemorrhoid up to the base, a 4-0 Vicryl suture was used to  first suture ligate the base. The hemorrhoid was then passed off the operative field to be sent to pathology. Any bleeding left from the excision was meticulously controlled with Bovie electrocautery.  The 4-0 Vicryl suture was used to close the defect in a running locked fashion in the internal component, followed by a simple running fashion in the more external component. The anal canal was irrigated with saline solution. An additional 3-0 Vicryl figure of 8 suture was placed in the apex of the hemorrhoidectomy incision for hemostasis. The anal canal and distal rectum were again irrigated and inspected for hemostasis. Fluffs and mesh panties were placed for dressing.     The patient tolerated this procedure well without complications. The patient was returned to the supine position and extubated in the operating room. She was brought to the recovery room in stable condition.     EBL: 2 ml    SPECIMEN: anal skin tag    COMPLICATIONS: None    Radha Mckeon MD

## 2019-09-25 NOTE — ANESTHESIA CARE TRANSFER NOTE
Patient: Alise Colon    Procedure(s):  EXAM UNDER ANESTHESIA WITH REMOVAL OF ANAL SKIN TAG    Diagnosis: ANAL SKIN TAG  Diagnosis Additional Information: No value filed.    Anesthesia Type:   MAC     Note:  Airway :Room Air  Patient transferred to:PACU  Comments:   Maryana Report: Identifed the Patient, Identified the Reponsible Provider, Reviewed the pertinent medical history, Discussed the surgical course, Reviewed Intra-OP anesthesia mangement and issues during anesthesia, Set expectations for post-procedure period and Allowed opportunity for questions and acknowledgement of understanding      Vitals: (Last set prior to Anesthesia Care Transfer)    CRNA VITALS  9/25/2019 0734 - 9/25/2019 0810      9/25/2019             Resp Rate (set):  10                Electronically Signed By: ENRICO Degroot CRNA  September 25, 2019  8:10 AM

## 2019-09-25 NOTE — DISCHARGE INSTRUCTIONS

## 2019-09-25 NOTE — BRIEF OP NOTE
[facility]    Brief Operative Note    Pre-operative diagnosis: ANAL SKIN TAG  Post-operative diagnosis same  Procedure: Procedure(s):  EXAM UNDER ANESTHESIA WITH REMOVAL OF ANAL SKIN TAG  Surgeon: Surgeon(s) and Role:     * Radha Mckeon MD - Primary  Anesthesia: Monitor Anesthesia Care   Estimated blood loss: Less than 10 ml  Drains: None  Specimens:   ID Type Source Tests Collected by Time Destination   A : Skin tag Tissue Buttock SURGICAL PATHOLOGY EXAM Radha Mckeon MD 9/25/2019  7:51 AM      Findings:   anterior midline skin tag .  Complications: None.

## 2019-09-26 LAB — COPATH REPORT: NORMAL

## 2019-10-12 ENCOUNTER — HOSPITAL ENCOUNTER (EMERGENCY)
Facility: CLINIC | Age: 74
Discharge: HOME | End: 2019-10-12
Attending: EMERGENCY MEDICINE | Admitting: EMERGENCY MEDICINE
Payer: MEDICARE

## 2019-10-12 VITALS
HEIGHT: 64 IN | DIASTOLIC BLOOD PRESSURE: 82 MMHG | SYSTOLIC BLOOD PRESSURE: 140 MMHG | WEIGHT: 185 LBS | BODY MASS INDEX: 31.58 KG/M2 | RESPIRATION RATE: 18 BRPM | OXYGEN SATURATION: 98 % | TEMPERATURE: 98.1 F

## 2019-10-12 DIAGNOSIS — Z87.19 STATUS POST HEMORRHOIDECTOMY: ICD-10-CM

## 2019-10-12 DIAGNOSIS — Z98.890 STATUS POST HEMORRHOIDECTOMY: ICD-10-CM

## 2019-10-12 DIAGNOSIS — Z48.89 ENCOUNTER FOR POSTOPERATIVE WOUND CHECK: ICD-10-CM

## 2019-10-12 PROCEDURE — 99282 EMERGENCY DEPT VISIT SF MDM: CPT

## 2019-10-12 ASSESSMENT — MIFFLIN-ST. JEOR: SCORE: 1314.15

## 2019-10-12 ASSESSMENT — ENCOUNTER SYMPTOMS
MUSCULOSKELETAL NEGATIVE: 1
CONSTITUTIONAL NEGATIVE: 1

## 2019-10-12 NOTE — DISCHARGE INSTRUCTIONS
Your exam today revealed a piece of surgical suture protruding from your anus, but there is no evidence of infection, bleeding, or other more immediately threatening process.  It is important that you follow up with your Colon/Rectal Surgery team this week for recheck in clinic.

## 2019-10-12 NOTE — ED AVS SNAPSHOT
Emergency Department  [address] [place] [place] [address]  Phone:  [phone]  Fax:  [phone]                                    Alise Colon   MRN: [id number]    Department:   Emergency Department   Date of Visit:  10/12/2019           After Visit Summary Signature Page    I have received my discharge instructions, and my questions have been answered. I have discussed any challenges I see with this plan with the nurse or doctor.    ..........................................................................................................................................  Patient/Patient Representative Signature      ..........................................................................................................................................  Patient Representative Print Name and Relationship to Patient    ..................................................               ................................................  Date                                   Time    ..........................................................................................................................................  Reviewed by Signature/Title    ...................................................              ..............................................  Date                                               Time          22EPIC Rev 08/18

## 2019-10-12 NOTE — ED PROVIDER NOTES
"  History     Chief Complaint:  Post-op Problem      HPI   Alise Colon is a 74 year old female who presents with post-op problem. The patient reports that she underwent hemorrhoidectomy on 9/25. This surgery was uncomplicated, and she has tolerated the recovery well. However, the patient reports that she still has two small \"lumps\" (noticed immediately after the surgery) to her rectal area that she thought would resolve following her surgery. She continues that due to these lumps, it is difficult to clean the area, so she has been using baby wipes to help with this. The patient reports that went to wipe herself this morning and felt \"a thread\". She states that she pulled the thread but realized that it was attached to her skin. This concerned her, prompting her to go to Urgent Care. Ms. Colon notes that the wait at Urgent Care was over three hours, so she came here for evaluation instead.  Here, the patient denies any rectal pain or bleeding. She denies any fevers or chills.  He states that her bowel movements have been fine.  There are no new bulges around her anus.      Allergies:  Morphine   Hydrocodone-acetaminophen   Sertraline  Cephalexin  Ketorolac   Lisinopril  Percocet   Plaquenil  Remeron      Medications:    Tylenol   Alpha Lipoic Acid  Diazepam  Gabapentin   Dilaudid   Nizoral cream   Hyzaar  Vitamin D    Past Medical History:    Back Pain  Depression   Fibromyalgia   Hypertension   Lymphoma   Polycythemia     Past Surgical History:    Colonoscopy   Excision of rectal lesion   Left knee surgery   Mammoplasty reduction   Toe fusion   Knee arthroscopy   Tonsillectomy     Family History:    The patient reports a maternal history of dementia and anxiety. The patient denies any other relevant family history.     Social History:  The patient is . The patient reports former tobacco use, quite 20 years ago. She denies alcohol and drug use.     Review of Systems   Constitutional: Negative.  " "  Musculoskeletal: Negative.      Physical Exam   First Vitals:  BP: (!) 140/82  Heart Rate: 67  Temp: 98.1  F (36.7  C)  Resp: 18  Height: 162.6 cm (5' 4\")  Weight: 83.9 kg (185 lb)  SpO2: 98 %    Physical Exam  General: Nontoxic-appearing woman sitting upright in room 13,  at bedside  HENT: mucous membranes moist  CV: regular rate  Resp: normal effort  GI: abdomen soft and nontender, no guarding  Rectal: Perianal area coated in a thin layer of thick white-cream which somewhat limits exam   Several small nontender subcentimeter soft tissue prominences to the anterior anus without evidence of thrombosis or active hemorrhage  One thin tan suture protruding a proximally 3 cm from the anus, with a knot tied.  This was removed by cutting it with scissors at skin level at time of exam.  No visible purulence, fluctuance, induration, or wound dehiscence is evident.  No signs of rectal prolapse.  MSK: no bony tenderness  Skin: appropriately warm and dry  Neuro: alert, clear speech, oriented  Psych: anxious, cooperative    Emergency Department Course     Emergency Department Course:  Nursing notes and vitals reviewed. I performed an exam of the patient as documented above.     I performed electronic chart review in EPIC.    Findings and plan explained to the Patient and spouse. Patient discharged home with instructions regarding supportive care, medications, and reasons to return. The importance of close follow-up was reviewed.      Impression & Plan      Medical Decision Making:  Her exam reveals evidence of a suture that has come partially undone, which corresponds with her noticing a \"thread\" from her anus.  I reviewed her op note describing use of Vicryl sutures, several of which were running, and this is likely what has come undone to some degree.  I felt it was reasonable to cut the suture at the level of the anus, so that it does not irritate her further, though it is still attached internally and I felt that " probing more internally would be more likely to cause iatrogenic harm than diagnostic benefit.  No signs of abscess, cellulitis, hemorrhage, or obvious wound dehiscence.  I do not think she requires emergent testing or immediate corrective surgery consultation, though I do think she would benefit from outpatient follow-up for a repeat wound check by her surgical team and return here for sudden worsening at any hour.  She is no fevers, new pains, new masses, or difficulty with bowel movements to suggest a more imminently threatening process.  Reassurance was provided and outpatient recommendations were reviewed with the patient who is eager for discharge home which was arranged.      Diagnosis:    ICD-10-CM   1. Encounter for postoperative wound check Z48.89   2. Status post hemorrhoidectomy Z98.890    Z87.19       Disposition:  Discharged to home.     This record was created at least in part using electronic voice recognition software, so please excuse any typographical errors.        Kaden Terrazas MD  10/12/19 4339

## 2022-10-08 ENCOUNTER — HOSPITAL ENCOUNTER (EMERGENCY)
Facility: CLINIC | Age: 77
Discharge: HOME | End: 2022-10-08
Attending: PHYSICIAN ASSISTANT | Admitting: PHYSICIAN ASSISTANT
Payer: MEDICARE

## 2022-10-08 VITALS
OXYGEN SATURATION: 95 % | TEMPERATURE: 97.6 F | SYSTOLIC BLOOD PRESSURE: 107 MMHG | RESPIRATION RATE: 16 BRPM | BODY MASS INDEX: 32.44 KG/M2 | DIASTOLIC BLOOD PRESSURE: 79 MMHG | HEIGHT: 64 IN | WEIGHT: 190 LBS | HEART RATE: 66 BPM

## 2022-10-08 DIAGNOSIS — R21 RASH AND NONSPECIFIC SKIN ERUPTION: ICD-10-CM

## 2022-10-08 PROCEDURE — 99283 EMERGENCY DEPT VISIT LOW MDM: CPT

## 2022-10-08 RX ORDER — METHYLPREDNISOLONE 4 MG
TABLET, DOSE PACK ORAL
Qty: 21 TABLET | Refills: 0 | Status: SHIPPED | OUTPATIENT
Start: 2022-10-08 | End: 2022-11-23

## 2022-10-08 NOTE — ED PROVIDER NOTES
History     Chief Complaint:  Rash     HPI   Alise Colon is a 77 year old female who presents to the ED for evaluation of rash.  Patient notes that she developed COVID similar symptoms 16 days ago and was started on molnupiravir 15 days ago. She states that about five days ago she developed a whole body rash which she describes as itchy and burning. No pustules present. No fevers, chills. No ongoing upper respiratory symptoms. No nausea, vomiting, diarrhea. She was seen yesterday at [facility] and prescribed Claritin, which she says is only minimally helping the pruritis.     ROS:  Review of Systems   Skin: Positive for rash.   All other systems reviewed and are negative.      Allergies:  Morphine  Hydrocodone-Acetaminophen  Sertraline  Cephalexin Hcl  Ketorolac  Lisinopril  Percocet [Oxycodone-Acetaminophen]  Plaquenil [Hydroxychloroquine]  Remeron [Mirtazapine]  Toradol     Medications:    acetaminophen (TYLENOL) 500 MG tablet  ALPHA LIPOIC ACID PO  diazepam (VALIUM) 2 MG tablet  gabapentin (NEURONTIN) 300 MG capsule  HYDROmorphone (DILAUDID) 2 MG tablet  ketoconazole (NIZORAL) 2 % cream  losartan-hydrochlorothiazide (HYZAAR) 100-25 MG per tablet  VITAMIN D, CHOLECALCIFEROL, PO        Past Medical History:    Past Medical History:   Diagnosis Date     Back pain      Depressive disorder      Fibromyalgia age 43     Hypertension      Lymphoma (H) 1999       Past Surgical History:    Past Surgical History:   Procedure Laterality Date     COLONOSCOPY  9/18/2012    Procedure: COLONOSCOPY;  COLONOSCOPY ;  Surgeon: Evan Hays MD;  Location:  GI     EXAM UNDER ANESTHESIA, EXCISE LESION RECTUM, COMBINED N/A 9/25/2019    Procedure: EXAM UNDER ANESTHESIA WITH REMOVAL OF ANAL SKIN TAG;  Surgeon: Radha Mckeon MD;  Location:  OR     KNEE SURGERY      left     MAMMOPLASTY REDUCTION       ORTHOPEDIC SURGERY  2012    toe fused     ORTHOPEDIC SURGERY  2011    knee arthroscopy     TOE SURGERY       "left big toe     TONSILLECTOMY          Family History:    family history includes Anxiety Disorder in her mother; Dementia (age of onset: 88) in her mother.    Social History:   reports that she quit smoking about 26 years ago. She has a 20.00 pack-year smoking history. She has never used smokeless tobacco. She reports that she does not drink alcohol and does not use drugs.    PCP: Elizabeth Daugherty     Physical Exam     Patient Vitals for the past 24 hrs:   BP Temp Temp src Pulse Resp SpO2 Height Weight   10/08/22 1415 107/79 97.6  F (36.4  C) Temporal 66 16 95 % 1.626 m (5' 4\") 86.2 kg (190 lb)        Physical Exam  Constitutional: Pleasant. Cooperative.   Eyes: Pupils equally round and reactive  HENT: Head is normal in appearance. Oropharynx is normal with moist mucus membranes.  Cardiovascular: Regular rate and rhythm and without murmurs.  Respiratory: Normal respiratory effort, lungs are clear bilaterally.  Musculoskeletal: No asymmetry of the lower extremities  Skin: Erythematous, blanching, convalescing wheals noted across body, including arms, chest, back, abdomen, and lower extremities, sparing palms and soles. No mucosal involvement.  Neurologic: Cranial nerves grossly intact, normal cognition, no focal deficits. Alert and oriented x 3.   Psychiatric: Normal affect.  Nursing notes and vital signs reviewed.    Emergency Department Course     Emergency Department Course:    Reviewed:  I reviewed nursing notes, vitals, past medical history and Care Everywhere    Assessments:   I obtained history and examined the patient as noted above.     Disposition:  The patient was discharged to home.     Impression & Plan      Medical Decision Making:  Alise Colon is a 77 year old female who presents to the ED with concern for a rash. Patient recently had COVID and was provided Molnupiravir. About 5 days after discontinuation of Molnupiravir, patient developed rash to whole body described as pruritic and burning. No " other new medications, medication changes, changes to soaps or detergents. No other ongoing symptoms following resolution of COVID symptoms. See HPI as above for additional details. Vitals and physical exam as above.  Differential was broad and included urticaria, erythema multiforme, SJS, TEN, TSS, pityriasis rosea, eczema, scabies, virals, shingles, contact dermatitis, meningococcemia, among others. Patient has no systemic symptoms at this time such as fevers, chills. Rash does appear to be likely urticarial in nature. Low suspicion for remainder of differential at this time based upon history and physical exam. Patient has taken prednisone at the beginning of the course of the rash with only modest improvement. Claritin is not helping much. Will try medrol dosepak, advised ongoing Claritin. Referral placed for allergy and dermatology. [place] patient was safe for discharge to home. Discussed reasons to return. All questions answered. Patient discharged to home in stable condition.    Diagnosis:    ICD-10-CM    1. Rash and nonspecific skin eruption  R21 Adult Allergy/Asthma Referral     Adult Dermatology Referral        Discharge Medications:  Discharge Medication List as of 10/8/2022  6:16 PM      START taking these medications    Details   methylPREDNISolone (MEDROL DOSEPAK) 4 MG tablet therapy pack Follow Package Directions, Disp-21 tablet, R-0, Local Print              10/8/2022   Reginald Medina PA-C     This record was created at least in part using electronic voice recognition software, so please excuse any typographical errors.       Reginald Medina PA-C  10/08/22 5324

## 2022-10-08 NOTE — DISCHARGE INSTRUCTIONS
The cause of your rash is unclear at this time. The rash is not consistent with any life-threatening process at this time. Continue with Claritin as prescribed. I will add on a steroid to see if this helps. I have placed referrals for dermatology and allergy.

## 2022-10-08 NOTE — ED TRIAGE NOTES
Patient was diagnosed with Covid on 9/23/22 and finished a course of antiviral medication and finished last Thursday. Monday this week started to have hives all over the body, seen at [facility] yesterday and sent home on Claritin but not improvement. Having constant burning and itching sensation.       Triage Assessment     Row Name 10/08/22 1419       Triage Assessment (Adult)    Airway WDL WDL       Respiratory WDL    Respiratory WDL WDL       Skin Circulation/Temperature WDL    Skin Circulation/Temperature WDL WDL       Cardiac WDL    Cardiac WDL WDL       Peripheral/Neurovascular WDL    Peripheral Neurovascular WDL WDL       Cognitive/Neuro/Behavioral WDL    Cognitive/Neuro/Behavioral WDL WDL

## 2022-10-10 ENCOUNTER — TELEPHONE (OUTPATIENT)
Dept: ALLERGY | Facility: CLINIC | Age: 77
End: 2022-10-10

## 2022-10-10 ENCOUNTER — TELEPHONE (OUTPATIENT)
Dept: FAMILY MEDICINE | Facility: CLINIC | Age: 77
End: 2022-10-10

## 2022-10-10 NOTE — TELEPHONE ENCOUNTER
S/w pt who states she already has 2 derm appts with [facility] and would like to stay with [facility] at this time.    No appt scheduled.    Nell PASTRANA RN  [facility] [place]  [phone]

## 2022-10-10 NOTE — TELEPHONE ENCOUNTER
[facility] Call Center    Phone Message    May a detailed message be left on voicemail: yes     Reason for Call: Appointment Intake    Referring Provider Name: HAWA COPELAND  Diagnosis and/or Symptoms: R21 (ICD-10-CM) - Rash and nonspecific skin eruption    Urgent referral please call to schedule    Action Taken: Message routed to:  Other: [facility] Allergy    Travel Screening: Not Applicable

## 2022-10-10 NOTE — TELEPHONE ENCOUNTER
Called patient to schedule off her urgent referral. Last minute NP spot opened tomorrow at 8AM. Patient is able to make this appointment with Dr. Mckinney. She has been taking methylPREDNISolone and claritin; however, writer explained that if Dr. Mckinney thinks skin testing is appropriate it can be done at a different appointment. Patient is feeling much better and her rash has improved since starting the methylPREDNISolone. Confirmed this date and time worked to schedule the appointment. Patient was given clinic address and phone number.    LC Parson, RN

## 2022-10-10 NOTE — TELEPHONE ENCOUNTER
[facility] Call Center    Phone Message    May a detailed message be left on voicemail: yes     Reason for Call: Appointment Intake    Referring Provider Name: HAWA COPELAND  Diagnosis and/or Symptoms: R21 (ICD-10-CM) - Rash and nonspecific skin eruption    Please call to schedule urgent 3-5 days referral. Pt also has an urgent allergy referral.     Action Taken: Message routed to:  Other: [facility]    Travel Screening: Not Applicable

## 2022-10-11 ENCOUNTER — OFFICE VISIT (OUTPATIENT)
Dept: ALLERGY | Facility: CLINIC | Age: 77
End: 2022-10-11
Payer: MEDICARE

## 2022-10-11 VITALS
OXYGEN SATURATION: 94 % | HEART RATE: 70 BPM | BODY MASS INDEX: 32.84 KG/M2 | DIASTOLIC BLOOD PRESSURE: 88 MMHG | WEIGHT: 191.3 LBS | SYSTOLIC BLOOD PRESSURE: 132 MMHG

## 2022-10-11 DIAGNOSIS — L50.9 URTICARIA: Primary | ICD-10-CM

## 2022-10-11 DIAGNOSIS — R21 RASH AND NONSPECIFIC SKIN ERUPTION: ICD-10-CM

## 2022-10-11 PROCEDURE — 99203 OFFICE O/P NEW LOW 30 MIN: CPT | Performed by: INTERNAL MEDICINE

## 2022-10-11 RX ORDER — SOTALOL HYDROCHLORIDE 80 MG/1
40 TABLET ORAL
COMMUNITY
Start: 2022-06-30 | End: 2023-06-30

## 2022-10-11 RX ORDER — PREGABALIN 100 MG/1
1 CAPSULE ORAL EVERY 8 HOURS
COMMUNITY
Start: 2022-06-17

## 2022-10-11 RX ORDER — SPIRONOLACTONE 25 MG/1
25 TABLET ORAL
Qty: 45 TABLET | Refills: 3 | COMMUNITY
Start: 2022-06-06 | End: 2023-06-06

## 2022-10-11 RX ORDER — KETOCONAZOLE 20 MG/ML
SHAMPOO TOPICAL
COMMUNITY

## 2022-10-11 ASSESSMENT — ENCOUNTER SYMPTOMS
EYE REDNESS: 0
COUGH: 0
VOMITING: 0
EYE DISCHARGE: 0
EYE ITCHING: 0
CHEST TIGHTNESS: 0
RHINORRHEA: 0
DIARRHEA: 0
MYALGIAS: 0
FACIAL SWELLING: 0
FEVER: 0
ARTHRALGIAS: 0
NAUSEA: 0
ADENOPATHY: 0
ACTIVITY CHANGE: 0
CHILLS: 0
SINUS PRESSURE: 0
HEADACHES: 0
JOINT SWELLING: 0
SHORTNESS OF BREATH: 0
WHEEZING: 0

## 2022-10-11 NOTE — LETTER
"    10/11/2022         RE: Alise Colon  [address] [facility] [place] [address]        Dear Colleague,    Thank you for referring your patient, Alise Colon, to the [facility] [place]. Please see a copy of my visit note below.    Alise Colon was seen in the Allergy Clinic at [facility].    Alise Colon is a 77 year old female being seen today at the request of Reginald Medina PA-C [facility]  in consultation for urticaria.    Chief Complaint   Patient presents with     Allergy Consult     After pt took Lagevrio-Molnupiravir for Covid, pt had hives all over her body,after took Methyprednisolone pt felt \"fine\".  Patient has been taking Claritin for her itching skin over the past 7 days.     The patient was diagnosed with COVID approximately September 23 and was subsequently treated with molnupiravir 9/24.    She developed urticaria on October 3.  She finished her antiviral approximately 5 days prior to the rash developing.  Photos of the rash look like urticaria which was present over most of her body on her chest, trunk, thighs and arms.  She was seen by urgent care on October 3 and her primary care provider on October 3.  She was treated with 20 mg of prednisone which was not helpful.  She was seen on October 7 in the emergency department as well as October 8.  She was started on Medrol Dosepak on October 9 which she found to be quite effective.  She is also taking Claritin daily.    She does not have any problems with fevers or chills or nausea or vomiting or cough.  The rash has significantly improved since starting the Medrol.      Past Medical History:   Diagnosis Date     Back pain      Depressive disorder      Fibromyalgia age 43     Hypertension      Lymphoma (H) 1999    started chemo     Family History   Problem Relation Age of Onset     Anxiety Disorder Mother      Dementia Mother 88     Past Surgical History: "   Procedure Laterality Date     COLONOSCOPY  9/18/2012    Procedure: COLONOSCOPY;  COLONOSCOPY ;  Surgeon: Evan Hays MD;  Location:  GI     EXAM UNDER ANESTHESIA, EXCISE LESION RECTUM, COMBINED N/A 9/25/2019    Procedure: EXAM UNDER ANESTHESIA WITH REMOVAL OF ANAL SKIN TAG;  Surgeon: Radha Mckeon MD;  Location:  OR     KNEE SURGERY      left     MAMMOPLASTY REDUCTION       ORTHOPEDIC SURGERY  2012    toe fused     ORTHOPEDIC SURGERY  2011    knee arthroscopy     TOE SURGERY      left big toe     TONSILLECTOMY         ENVIRONMENTAL HISTORY:   Pets inside the house include None.  Do you smoke cigarettes or other recreational drugs? No There is/are 0 smokers living in the house. The house does not have a damp basement.     SOCIAL HISTORY:   Alise is retired She lives with her .      Review of Systems   Constitutional: Negative for activity change, chills and fever.   HENT: Negative for congestion, dental problem, ear pain, facial swelling, nosebleeds, postnasal drip, rhinorrhea, sinus pressure and sneezing.    Eyes: Negative for discharge, redness and itching.   Respiratory: Negative for cough, chest tightness, shortness of breath and wheezing.    Cardiovascular: Negative for chest pain.   Gastrointestinal: Negative for diarrhea, nausea and vomiting.   Musculoskeletal: Negative for arthralgias, joint swelling and myalgias.   Skin: Negative for rash.   Neurological: Negative for headaches.   Hematological: Negative for adenopathy.   Psychiatric/Behavioral: Negative for behavioral problems and self-injury.         Current Outpatient Medications:      cholecalciferol 50 MCG (2000 UT) tablet, Take 1 tablet (50 mcg) by mouth daily, Disp: , Rfl:      gabapentin (NEURONTIN) 300 MG capsule, 600 mg 3 times daily , Disp: , Rfl:      ketoconazole (NIZORAL) 2 % external shampoo, , Disp: , Rfl:      losartan-hydrochlorothiazide (HYZAAR) 100-25 MG per tablet, Take 0.5 tablets by mouth daily, Disp: ,  Rfl:      methylPREDNISolone (MEDROL DOSEPAK) 4 MG tablet therapy pack, Follow Package Directions, Disp: 21 tablet, Rfl: 0     pregabalin (LYRICA) 100 MG capsule, Take 1 capsule (100 mg) by mouth every 8 hours, Disp: , Rfl:      sotalol (BETAPACE) 80 MG tablet, Take 0.5 tablets (40 mg) by mouth, Disp: , Rfl:      spironolactone (ALDACTONE) 25 MG tablet, Take 1 tablet (25 mg) by mouth every 48 hours, Disp: 45 tablet, Rfl: 3     VITAMIN D, CHOLECALCIFEROL, PO, Take 2,000 Units by mouth daily , Disp: , Rfl:      acetaminophen (TYLENOL) 500 MG tablet, Take 2 tablets (1,000 mg) by mouth every 8 hours (Patient not taking: Reported on 10/11/2022), Disp: 120 tablet, Rfl: 0     ALPHA LIPOIC ACID PO, Take 200 mg by mouth daily (Patient not taking: Reported on 10/11/2022), Disp: , Rfl:      apixaban ANTICOAGULANT (ELIQUIS) 5 MG tablet, Take 1 tablet (5 mg) by mouth 2 times daily (Patient not taking: Reported on 10/11/2022), Disp: , Rfl:      diazepam (VALIUM) 2 MG tablet, Take 1-2 tablets (2-4 mg) by mouth every 6 hours as needed for muscle spasms (Patient not taking: Reported on 10/11/2022), Disp: 20 tablet, Rfl: 0     HYDROmorphone (DILAUDID) 2 MG tablet, Take 1 tablet (2 mg) by mouth every 4 hours as needed for moderate to severe pain or severe pain (Patient not taking: Reported on 10/11/2022), Disp: 20 tablet, Rfl: 0     ketoconazole (NIZORAL) 2 % cream, shampoo (Patient not taking: Reported on 10/11/2022), Disp: , Rfl:   Allergies   Allergen Reactions     Morphine Other (See Comments)     Severe agitation     Hydrocodone-Acetaminophen Nausea     Sertraline Other (See Comments)     Head ache     Cephalexin Hcl      Ketorolac      Lisinopril      Percocet [Oxycodone-Acetaminophen]      Plaquenil [Hydroxychloroquine]      Worsening tinnitus  Worsening tinnitus     Remeron [Mirtazapine] Swelling     Ankle edema     Toradol      Redness, numbness in hands         EXAM:   /88   Pulse 70   Wt 86.8 kg (191 lb 4.8 oz)    SpO2 94%   BMI 32.84 kg/m      Physical Exam    Constitutional:       General: She is not in acute distress.     Appearance: Normal appearance. She is not ill-appearing.   HENT:      Head: Normocephalic and atraumatic.      Nose: Nose normal. No congestion or rhinorrhea.      Mouth/Throat:      Mouth: Mucous membranes are moist.      Pharynx: Oropharynx is clear. No posterior oropharyngeal erythema.   Eyes:      General:         Right eye: No discharge.         Left eye: No discharge.   Cardiovascular:      Rate and Rhythm: Normal rate and regular rhythm.      Heart sounds: Normal heart sounds.   Pulmonary:      Effort: Pulmonary effort is normal.      Breath sounds: Normal breath sounds. No wheezing or rhonchi.   Skin:     General: Skin is warm.      Findings: She has mild erythema of her upper chest.  No obvious hives.  Neurological:      General: No focal deficit present.      Mental Status: She is alert. Mental status is at baseline.   Psychiatric:         Mood and Affect: Mood normal.         Behavior: Behavior normal.       ASSESSMENT/PLAN:  Alise Colon is a 77 year old female seen today with a recent episode of urticaria approximately 2 weeks after developing COVID.  She was also on antiviral therapy.  The most likely cause is the COVID infection, which has been reported in the literature.  This may persist up to 6 months.    1. Start Zyrtec 10 mg at night.  Continue for 1 month, then stop.  May resume if the rash returns.  Call with questions.  2. Continue Medrol  3. Stop Claritin  4. The hives are most likely related to the COVID infection.    Follow-up as needed.      Thank you for allowing me to participate in the care of Alise Colon.      I spent 30 minutes on the date of the encounter doing chart review, history and exam, documentation and further coordination as noted above exclusive of separately reported interpretations    Indra Mckinney MD  Allergy/Immunology  [facility] -  [place]        Again, thank you for allowing me to participate in the care of your patient.        Sincerely,        Indra Mckinney MD

## 2022-10-11 NOTE — PROGRESS NOTES
"Alise Colon was seen in the Allergy Clinic at [facility].    Alise Colon is a 77 year old female being seen today at the request of Reginald Medina PA-C [facility]  in consultation for urticaria.    Chief Complaint   Patient presents with     Allergy Consult     After pt took Lagevrio-Molnupiravir for Covid, pt had hives all over her body,after took Methyprednisolone pt felt \"fine\".  Patient has been taking Claritin for her itching skin over the past 7 days.     The patient was diagnosed with COVID approximately September 23 and was subsequently treated with molnupiravir 9/24.    She developed urticaria on October 3.  She finished her antiviral approximately 5 days prior to the rash developing.  Photos of the rash look like urticaria which was present over most of her body on her chest, trunk, thighs and arms.  She was seen by urgent care on October 3 and her primary care provider on October 3.  She was treated with 20 mg of prednisone which was not helpful.  She was seen on October 7 in the emergency department as well as October 8.  She was started on Medrol Dosepak on October 9 which she found to be quite effective.  She is also taking Claritin daily.    She does not have any problems with fevers or chills or nausea or vomiting or cough.  The rash has significantly improved since starting the Medrol.      Past Medical History:   Diagnosis Date     Back pain      Depressive disorder      Fibromyalgia age 43     Hypertension      Lymphoma (H) 1999    started chemo     Family History   Problem Relation Age of Onset     Anxiety Disorder Mother      Dementia Mother 88     Past Surgical History:   Procedure Laterality Date     COLONOSCOPY  9/18/2012    Procedure: COLONOSCOPY;  COLONOSCOPY ;  Surgeon: Evan Hays MD;  Location:  GI     EXAM UNDER ANESTHESIA, EXCISE LESION RECTUM, COMBINED N/A 9/25/2019    Procedure: EXAM UNDER ANESTHESIA WITH REMOVAL OF " ANAL SKIN TAG;  Surgeon: Radha Mckeon MD;  Location: [facility] OR     KNEE SURGERY      left     MAMMOPLASTY REDUCTION       ORTHOPEDIC SURGERY  2012    toe fused     ORTHOPEDIC SURGERY  2011    knee arthroscopy     TOE SURGERY      left big toe     TONSILLECTOMY         ENVIRONMENTAL HISTORY:   Pets inside the house include None.  Do you smoke cigarettes or other recreational drugs? No There is/are 0 smokers living in the house. The house does not have a damp basement.     SOCIAL HISTORY:   Alise is retired She lives with her .      Review of Systems   Constitutional: Negative for activity change, chills and fever.   HENT: Negative for congestion, dental problem, ear pain, facial swelling, nosebleeds, postnasal drip, rhinorrhea, sinus pressure and sneezing.    Eyes: Negative for discharge, redness and itching.   Respiratory: Negative for cough, chest tightness, shortness of breath and wheezing.    Cardiovascular: Negative for chest pain.   Gastrointestinal: Negative for diarrhea, nausea and vomiting.   Musculoskeletal: Negative for arthralgias, joint swelling and myalgias.   Skin: Negative for rash.   Neurological: Negative for headaches.   Hematological: Negative for adenopathy.   Psychiatric/Behavioral: Negative for behavioral problems and self-injury.         Current Outpatient Medications:      cholecalciferol 50 MCG (2000 UT) tablet, Take 1 tablet (50 mcg) by mouth daily, Disp: , Rfl:      gabapentin (NEURONTIN) 300 MG capsule, 600 mg 3 times daily , Disp: , Rfl:      ketoconazole (NIZORAL) 2 % external shampoo, , Disp: , Rfl:      losartan-hydrochlorothiazide (HYZAAR) 100-25 MG per tablet, Take 0.5 tablets by mouth daily, Disp: , Rfl:      methylPREDNISolone (MEDROL DOSEPAK) 4 MG tablet therapy pack, Follow Package Directions, Disp: 21 tablet, Rfl: 0     pregabalin (LYRICA) 100 MG capsule, Take 1 capsule (100 mg) by mouth every 8 hours, Disp: , Rfl:      sotalol (BETAPACE) 80 MG tablet, Take 0.5  tablets (40 mg) by mouth, Disp: , Rfl:      spironolactone (ALDACTONE) 25 MG tablet, Take 1 tablet (25 mg) by mouth every 48 hours, Disp: 45 tablet, Rfl: 3     VITAMIN D, CHOLECALCIFEROL, PO, Take 2,000 Units by mouth daily , Disp: , Rfl:      acetaminophen (TYLENOL) 500 MG tablet, Take 2 tablets (1,000 mg) by mouth every 8 hours (Patient not taking: Reported on 10/11/2022), Disp: 120 tablet, Rfl: 0     ALPHA LIPOIC ACID PO, Take 200 mg by mouth daily (Patient not taking: Reported on 10/11/2022), Disp: , Rfl:      apixaban ANTICOAGULANT (ELIQUIS) 5 MG tablet, Take 1 tablet (5 mg) by mouth 2 times daily (Patient not taking: Reported on 10/11/2022), Disp: , Rfl:      diazepam (VALIUM) 2 MG tablet, Take 1-2 tablets (2-4 mg) by mouth every 6 hours as needed for muscle spasms (Patient not taking: Reported on 10/11/2022), Disp: 20 tablet, Rfl: 0     HYDROmorphone (DILAUDID) 2 MG tablet, Take 1 tablet (2 mg) by mouth every 4 hours as needed for moderate to severe pain or severe pain (Patient not taking: Reported on 10/11/2022), Disp: 20 tablet, Rfl: 0     ketoconazole (NIZORAL) 2 % cream, shampoo (Patient not taking: Reported on 10/11/2022), Disp: , Rfl:   Allergies   Allergen Reactions     Morphine Other (See Comments)     Severe agitation     Hydrocodone-Acetaminophen Nausea     Sertraline Other (See Comments)     Head ache     Cephalexin Hcl      Ketorolac      Lisinopril      Percocet [Oxycodone-Acetaminophen]      Plaquenil [Hydroxychloroquine]      Worsening tinnitus  Worsening tinnitus     Remeron [Mirtazapine] Swelling     Ankle edema     Toradol      Redness, numbness in hands         EXAM:   /88   Pulse 70   Wt 86.8 kg (191 lb 4.8 oz)   SpO2 94%   BMI 32.84 kg/m      Physical Exam    Constitutional:       General: She is not in acute distress.     Appearance: Normal appearance. She is not ill-appearing.   HENT:      Head: Normocephalic and atraumatic.      Nose: Nose normal. No congestion or rhinorrhea.       Mouth/Throat:      Mouth: Mucous membranes are moist.      Pharynx: Oropharynx is clear. No posterior oropharyngeal erythema.   Eyes:      General:         Right eye: No discharge.         Left eye: No discharge.   Cardiovascular:      Rate and Rhythm: Normal rate and regular rhythm.      Heart sounds: Normal heart sounds.   Pulmonary:      Effort: Pulmonary effort is normal.      Breath sounds: Normal breath sounds. No wheezing or rhonchi.   Skin:     General: Skin is warm.      Findings: She has mild erythema of her upper chest.  No obvious hives.  Neurological:      General: No focal deficit present.      Mental Status: She is alert. Mental status is at baseline.   Psychiatric:         Mood and Affect: Mood normal.         Behavior: Behavior normal.       ASSESSMENT/PLAN:  Alise Colon is a 77 year old female seen today with a recent episode of urticaria approximately 2 weeks after developing COVID.  She was also on antiviral therapy.  The most likely cause is the COVID infection, which has been reported in the literature.  This may persist up to 6 months.    1. Start Zyrtec 10 mg at night.  Continue for 1 month, then stop.  May resume if the rash returns.  Call with questions.  2. Continue Medrol  3. Stop Claritin  4. The hives are most likely related to the COVID infection.    Follow-up as needed.      Thank you for allowing me to participate in the care of Alise Colon.      I spent 30 minutes on the date of the encounter doing chart review, history and exam, documentation and further coordination as noted above exclusive of separately reported interpretations    Indra Mckinney MD  Allergy/Immunology  [facility]

## 2022-10-11 NOTE — PATIENT INSTRUCTIONS
Start Zyrtec 10 mg at night.  Continue for 1 month, then stop.  May resume if the rash returns.  Call with questions.  Continue Medrol  Stop Claritin  The hives are most likely related to the COVID infection.            Allergy Staff Appt Hours Shot Hours Location         Physician   Indra Mckinney MD      Support Staff   GÉNESIS Pantoja MA          Mondays  Not available until January/2023 Wednesdays  Close    Tuesdays Thursdays and Fridays:  [place] 7-5                    [place]     Tuesdays: 7:40-3:20      Fridays: 7:40-4:20                [facility]  [address]  [place] [address]  Appt Line: [phone]    Pulmonary Function Scheduling:  [place]: [phone]         Questions about cost of your care?  For questions about your cost of your visit, procedure, lab or imaging contact: [employer] Consumer Price Line [phone] or visit: [email or web address]    Important Scheduling Information  Appointments for skin testing: Appointment will last approximately 45 minutes.  Please call the appointment line for your clinic to schedule.  Discontinue oral antihistamines 7 days prior to the appointment.  Discontinue nasal and ocular antihistamines 4 days prior to appointment.    Appointments for challenges (oral food challenge, penicillin testing, aspirin desensitization) If your provider instructs you to that this additional testing is indicated, it will need to be scheduled directly through the allergy department.  Please contact them via Mobi or by calling the clinic and asking to speak with the allergy team.  They will provide additional information and instructions for the appointment.  Discontinue oral antihistamines 7 days prior to the appointment.  Discontinue nasal and ocular antihistamines 4 days prior to the appointment.    Thank you for trusting us with your care. Please feel free to contact us with any questions or concerns  you may have.

## 2022-11-23 ENCOUNTER — OFFICE VISIT (OUTPATIENT)
Dept: FAMILY MEDICINE | Facility: CLINIC | Age: 77
End: 2022-11-23
Payer: MEDICARE

## 2022-11-23 VITALS
DIASTOLIC BLOOD PRESSURE: 78 MMHG | RESPIRATION RATE: 16 BRPM | HEART RATE: 79 BPM | SYSTOLIC BLOOD PRESSURE: 130 MMHG | OXYGEN SATURATION: 95 % | BODY MASS INDEX: 33.19 KG/M2 | WEIGHT: 194.4 LBS | HEIGHT: 64 IN | TEMPERATURE: 97.5 F

## 2022-11-23 DIAGNOSIS — G89.4 CHRONIC PAIN DISORDER: Primary | ICD-10-CM

## 2022-11-23 DIAGNOSIS — I48.0 PAROXYSMAL ATRIAL FIBRILLATION (H): ICD-10-CM

## 2022-11-23 DIAGNOSIS — Z95.0 CARDIAC PACEMAKER IN SITU: ICD-10-CM

## 2022-11-23 DIAGNOSIS — Z85.72 HISTORY OF NON-HODGKIN'S LYMPHOMA: ICD-10-CM

## 2022-11-23 DIAGNOSIS — M06.00 RHEUMATOID ARTHRITIS WITH NEGATIVE RHEUMATOID FACTOR, INVOLVING UNSPECIFIED SITE (H): ICD-10-CM

## 2022-11-23 DIAGNOSIS — I49.5 SSS (SICK SINUS SYNDROME) (H): ICD-10-CM

## 2022-11-23 DIAGNOSIS — Z13.220 SCREENING FOR HYPERLIPIDEMIA: ICD-10-CM

## 2022-11-23 DIAGNOSIS — F33.1 MODERATE EPISODE OF RECURRENT MAJOR DEPRESSIVE DISORDER (H): ICD-10-CM

## 2022-11-23 DIAGNOSIS — Z79.899 MEDICAL MARIJUANA USE: ICD-10-CM

## 2022-11-23 DIAGNOSIS — G62.9 POLYNEUROPATHY: ICD-10-CM

## 2022-11-23 PROBLEM — M17.0 PRIMARY OSTEOARTHRITIS OF BOTH KNEES: Status: ACTIVE | Noted: 2021-08-26

## 2022-11-23 PROBLEM — M18.0 PRIMARY OSTEOARTHRITIS OF BOTH FIRST CARPOMETACARPAL JOINTS: Status: ACTIVE | Noted: 2021-08-26

## 2022-11-23 PROBLEM — R76.8 POSITIVE ANTI-CCP TEST: Status: ACTIVE | Noted: 2019-04-16

## 2022-11-23 PROBLEM — F43.22 ADJUSTMENT DISORDER WITH ANXIETY: Status: ACTIVE | Noted: 2018-07-17

## 2022-11-23 PROBLEM — N81.2 UTEROVAGINAL PROLAPSE, INCOMPLETE: Status: ACTIVE | Noted: 2021-08-30

## 2022-11-23 PROCEDURE — 99205 OFFICE O/P NEW HI 60 MIN: CPT | Performed by: INTERNAL MEDICINE

## 2022-11-23 ASSESSMENT — PATIENT HEALTH QUESTIONNAIRE - PHQ9
SUM OF ALL RESPONSES TO PHQ QUESTIONS 1-9: 3
10. IF YOU CHECKED OFF ANY PROBLEMS, HOW DIFFICULT HAVE THESE PROBLEMS MADE IT FOR YOU TO DO YOUR WORK, TAKE CARE OF THINGS AT HOME, OR GET ALONG WITH OTHER PEOPLE: SOMEWHAT DIFFICULT
SUM OF ALL RESPONSES TO PHQ QUESTIONS 1-9: 3

## 2022-11-23 ASSESSMENT — PAIN SCALES - GENERAL: PAINLEVEL: NO PAIN (0)

## 2022-11-23 NOTE — PROGRESS NOTES
1. Chronic pain disorder  2. Polyneuropathy  3. Medical marijuana use  4. Rheumatoid arthritis with negative rheumatoid factor, involving unspecified site (H)  Ongoing problem, Uncontrolled. Pt was diagnosed with positive CCP in 2019 and has been following Rheumatology who opined as less likely of rheumatological condition but more of neuropathy. Neurology on board who did have positive EMG proven Neuropathy. Does have ongoing foot and knee pains which imaging showed positive findings as below.  -  Tried and failed Gabapentin which was weaned down slowly and started on Lyrica which pt states not helping much. Given pt Medical Marijuana offered her pain management referral which she declined. Can given her Cymbalta which I discussed most part of this appointment which she asked for more information which I have provided her. Pt will let us know if she decides for it before we prescribe her.     5. History of non-Hodgkin's lymphoma  In Remission , S/P completion of CHOP therapy.     6. Moderate episode of recurrent major depressive disorder (H)  Well controlled, continue current lifestyle modifications.     7. Paroxysmal atrial fibrillation (H)  8. SSS (sick sinus syndrome) (H)  9. Cardiac pacemaker in situ  Chronic stable conditions, rate controlled with Sotalol. Continue current Elquis and Spirinolactone , follow up with Cardiology.     10. Screening for hyperlipidemia  - Lipid panel reflex to direct LDL Non-fasting; Future   All other labs were done at  in 10/2022 reviewed which we will not repeat at this time.       Over 60 minutes spent on reviewing patient chart,  face to face encounter, greater than 50% time spent with plan/cordination of care and documentation as above in my A/P.         Patient Instructions   As discussed , your current neuropathy which you state that your Lyrica is not helping much and given your medical Marijuana use recommend Pain clinic referral.     OK to try Cymbalta given your  Chronic pain which is safer alternative. Will await for your decision on it as opted.     =========================    Return in about 3 months (around 2/23/2023), or if symptoms worsen or fail to improve, for Annual Wellness Exam, patient opting to establish with [facility] as there is UC with it.    Lucita Lassiter MD  [facility] HERMINIO Carrero is a 77 year old, presenting for the following health issues:  Recheck Medication      History of Present Illness       Reason for visit:  Getacquainted    She eats 4 or more servings of fruits and vegetables daily.She consumes 1 sweetened beverage(s) daily.She exercises with enough effort to increase her heart rate 10 to 19 minutes per day.  She exercises with enough effort to increase her heart rate 3 or less days per week.     Today's PHQ-9         PHQ-9 Total Score: 3    PHQ-9 Q9 Thoughts of better off dead/self-harm past 2 weeks :   Not at all    How difficult have these problems made it for you to do your work, take care of things at home, or get along with other people: Somewhat difficult     Pt is new to me , here for getting aquainted with [facility] as she is trying to choose which location she wants to follow.  She does have medical conditions of Chronic pain syndrome most likely due to Hereditary and idiopathic peripheral neuropathy. She does also have a complex history a large cell lymphoma in 1999 status post chemotherapy, also background fibromyalgia anxiety hypertension headaches and reflux.    Does have Cardiac conditions of SSS on Cardiac pacemaker. On Elquis and last device check in 8/2022 following cardiology at .     Pt is on Dilaudid , Diazepam on patient medication list which she states not taking anymore -  check showing Which is showing only Lyrica with 180 tabs/2 months by PCP at  [facility].     X-ray left foot October 10, 2020 is reviewed, shows fusion of the 1st MTP joint and some degenerative  "midfoot changes. X-ray of the right foot in the past has shown hallux valgus and 1st MTP arthritis.She notes some pain at the base of the thumbs, left forefoot area and left greater than right knee. Positive for Osteoarthritis in the knees on MRI, had seen Orthopedics. MRI of the right ankle in March 2022 shows some Achilles tendinitis, mild degenerative changes in the ankle, MRI of left ankle showed diffuse Achilles tendinitis and patellar chondromalacia and mild meniscal abnormality on the left and just slight meniscal abnormality on the right, the orthopedist gave her a cortisone injection in left knee but this did nothing. She has [facility].      Allergies   Allergen Reactions     Morphine Other (See Comments)     Severe agitation     Cephalexin Unknown, Hives and Itching     Other reaction(s): *Skin Rash     Hydrocodone-Acetaminophen Nausea and Nausea and Vomiting     Mirtazapine Swelling, Hives and Other (See Comments)     Ankle edema  Other reaction(s): ankle edema  Ankle edema  in feet  Other reaction(s): Swelling  Ankle edema       Sertraline Other (See Comments)     Head ache  Headache  headache    Head ache       Atorvastatin Muscle Pain (Myalgia) and Other (See Comments)     Leg pain  Leg pain  Leg pain       Cephalexin Hcl      Codeine Itching     Ketorolac Other (See Comments)     Other reaction(s): redness, numbness in hands  Redness, numbness in hands  redness, numbness in hands  redness, numbness in hands  Redness, numbness in hands       Ketorolac Tromethamine Other (See Comments)     Redness, numbness in hands     Oxycodone-Acetaminophen Itching and Nausea and Vomiting     Plaquenil [Hydroxychloroquine]      Worsening tinnitus  Worsening tinnitus     Toradol      Redness, numbness in hands     Lisinopril Other (See Comments) and Rash     \"hot and red\"          Past Medical History:   Diagnosis Date     Back pain      Depressive disorder      Fibromyalgia age 43     " "Hypertension      Lymphoma (H)     started chemo     Non-toxic multinodular goiter 2011     YUMIKO (obstructive sleep apnea) 2016       Past Surgical History:   Procedure Laterality Date     COLONOSCOPY  2012    Procedure: COLONOSCOPY;  COLONOSCOPY ;  Surgeon: Evan Hays MD;  Location:  GI     EXAM UNDER ANESTHESIA, EXCISE LESION RECTUM, COMBINED N/A 2019    Procedure: EXAM UNDER ANESTHESIA WITH REMOVAL OF ANAL SKIN TAG;  Surgeon: Radha Mckeon MD;  Location:  OR     KNEE SURGERY      left     MAMMOPLASTY REDUCTION       ORTHOPEDIC SURGERY      toe fused     ORTHOPEDIC SURGERY      knee arthroscopy     TOE SURGERY      left big toe     TONSILLECTOMY         Family History   Problem Relation Age of Onset     Anxiety Disorder Mother      Dementia Mother 88       Social History     Tobacco Use     Smoking status: Former     Packs/day: 1.00     Years: 20.00     Pack years: 20.00     Types: Cigarettes     Quit date: 1996     Years since quittin.9     Smokeless tobacco: Never   Substance Use Topics     Alcohol use: No        Current Outpatient Medications   Medication     apixaban ANTICOAGULANT (ELIQUIS) 5 MG tablet     ketoconazole (NIZORAL) 2 % external shampoo     pregabalin (LYRICA) 100 MG capsule     sotalol (BETAPACE) 80 MG tablet     spironolactone (ALDACTONE) 25 MG tablet     VITAMIN D, CHOLECALCIFEROL, PO     No current facility-administered medications for this visit.        Review of Systems   Constitutional, HEENT, cardiovascular, pulmonary, GI, , musculoskeletal, neuro, skin, endocrine and psych systems are negative, except as otherwise noted.      Objective    /78   Pulse 79   Temp 97.5  F (36.4  C) (Temporal)   Resp 16   Ht 1.626 m (5' 4\")   Wt 88.2 kg (194 lb 6.4 oz)   SpO2 95%   BMI 33.37 kg/m    Body mass index is 33.37 kg/m .  Physical Exam   GENERAL: healthy, alert and no distress  NECK: no adenopathy, no asymmetry, masses, or " scars and thyroid normal to palpation  RESP: lungs clear to auscultation - no rales, rhonchi or wheezes  CV: regular rate and rhythm, normal S1 S2, no S3 or S4, no murmur, click or rub, no peripheral edema and peripheral pulses strong  ABDOMEN: soft, nontender, no hepatosplenomegaly, no masses and bowel sounds normal  MS: no gross musculoskeletal defects noted, no edema

## 2022-11-23 NOTE — PATIENT INSTRUCTIONS
As discussed , your current neuropathy which you state that your Lyrica is not helping much and given your medical Marijuana use recommend Pain clinic referral.     OK to try Cymbalta given your Chronic pain which is safer alternative. Will await for your decision on it as opted.     =========================

## 2023-04-07 ENCOUNTER — TELEPHONE (OUTPATIENT)
Dept: FAMILY MEDICINE | Facility: CLINIC | Age: 78
End: 2023-04-07

## 2023-04-07 NOTE — LETTER
April 7, 2023      Alise Colon  [address] [place] [address]        Dear Alise,    I care about your health and have reviewed your health plan. I have reviewed your medical conditions, medication list, and lab results and am making recommendations based on this review, to better manage your health.    You are in particular need of attention regarding:  -Wellness (Physical) Visit     I am recommending that you:  -schedule a WELLNESS (Physical) APPOINTMENT with me.   I will check fasting labs the same day - nothing to eat except water and meds for 8-10 hours prior.    Here is a list of Health Maintenance topics that are due now or due soon:  Health Maintenance Due   Topic Date Due    Osteoporosis Screening  Never done    URINE DRUG SCREEN  Never done    ANNUAL REVIEW OF HM ORDERS  Never done    Discuss Advance Care Planning  Never done    Depression Action Plan  Never done    Cholesterol Lab  Never done    Zoster (Shingles) Vaccine (2 of 3) 01/22/2008    Annual Wellness Visit  10/19/2019    COVID-19 Vaccine (5 - Booster for Pfizer series) 09/29/2022       Please call us at [phone] (or use [employer]) to address the above recommendations.     Thank you for trusting [facility] and we appreciate the opportunity to serve you.  We look forward to supporting your healthcare needs in the future.    Healthy Regards,    Lucita Lassiter MD

## 2023-04-07 NOTE — TELEPHONE ENCOUNTER
Patient Quality Outreach    Patient is due for the following:   Physical Annual Wellness Visit    Next Steps:   Schedule a Annual Wellness Visit    Type of outreach:    Sent letter.      Questions for provider review:    None     Norma Eden MA

## (undated) DEVICE — SU VICRYL 4-0 PS-2 18" UND J496H

## (undated) DEVICE — GLOVE PROTEXIS W/NEU-THERA 6.5  2D73TE65

## (undated) DEVICE — ESU PENCIL W/SMOKE EVAC NEPTUNE STRYKER 0703-046-000

## (undated) DEVICE — LUBRICATING JELLY 4.25OZ

## (undated) DEVICE — GLOVE PROTEXIS BLUE W/NEU-THERA 6.5  2D73EB65

## (undated) DEVICE — SU VICRYL 3-0 SH 27" UND J416H

## (undated) DEVICE — ESU HOLSTER PLASTIC DISP E2400

## (undated) DEVICE — SYR EAR BULB 3OZ 0035830

## (undated) DEVICE — SPONGE BALL KERLIX ROUND XL W/O STRING LATEX 4935

## (undated) DEVICE — KIT SURGICAL TURNOVER FVSD-01D

## (undated) DEVICE — PACK MINOR SBA15MIFSE

## (undated) DEVICE — LINEN TOWEL PACK X5 5464

## (undated) DEVICE — PANTIES MESH LG/XLG 2PK 706M2

## (undated) DEVICE — DECANTER VIAL 2006S

## (undated) DEVICE — TAPE CLOTH ADHESIVE 3" LATEX FREE

## (undated) DEVICE — SUCTION TIP YANKAUER W/O VENT K86

## (undated) DEVICE — SUCTION CANISTER MEDIVAC LINER 3000ML W/LID 65651-530

## (undated) DEVICE — ESU ELEC NDL 1" E1552

## (undated) DEVICE — DRAPE MINOR PROCEDURE LAP 29496

## (undated) RX ORDER — PROPOFOL 10 MG/ML
INJECTION, EMULSION INTRAVENOUS
Status: DISPENSED
Start: 2019-09-25

## (undated) RX ORDER — FENTANYL CITRATE 50 UG/ML
INJECTION, SOLUTION INTRAMUSCULAR; INTRAVENOUS
Status: DISPENSED
Start: 2019-09-25